# Patient Record
Sex: FEMALE | Race: WHITE | URBAN - METROPOLITAN AREA
[De-identification: names, ages, dates, MRNs, and addresses within clinical notes are randomized per-mention and may not be internally consistent; named-entity substitution may affect disease eponyms.]

---
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
## 2019-01-11 ENCOUNTER — OFFICE VISIT (OUTPATIENT)
Dept: FAMILY MEDICINE CLINIC | Age: 19
End: 2019-01-11

## 2019-01-11 VITALS
HEART RATE: 84 BPM | TEMPERATURE: 96.1 F | WEIGHT: 130.2 LBS | OXYGEN SATURATION: 100 % | DIASTOLIC BLOOD PRESSURE: 67 MMHG | HEIGHT: 63 IN | RESPIRATION RATE: 18 BRPM | BODY MASS INDEX: 23.07 KG/M2 | SYSTOLIC BLOOD PRESSURE: 112 MMHG

## 2019-01-11 DIAGNOSIS — Z23 ENCOUNTER FOR IMMUNIZATION: ICD-10-CM

## 2019-01-11 DIAGNOSIS — Z00.00 ROUTINE ADULT HEALTH MAINTENANCE: Primary | ICD-10-CM

## 2019-01-11 DIAGNOSIS — F90.2 ADHD (ATTENTION DEFICIT HYPERACTIVITY DISORDER), COMBINED TYPE: ICD-10-CM

## 2019-01-11 RX ORDER — METHYLPHENIDATE HYDROCHLORIDE 36 MG/1
72 TABLET ORAL
Qty: 180 TAB | Refills: 0 | Status: SHIPPED | OUTPATIENT
Start: 2019-01-11 | End: 2019-03-05

## 2019-01-11 NOTE — PROGRESS NOTES
Cyndee Kline is a 22 y.o. female (: 2000) presenting to address:    Chief Complaint   Patient presents with    Medication Evaluation       Vitals:    19 1323   BP: 112/67   Pulse: 84   Resp: 18   Temp: 96.1 °F (35.6 °C)   TempSrc: Oral   SpO2: 100%   Weight: 130 lb 3.2 oz (59.1 kg)   Height: 5' 3\" (1.6 m)   PainSc:   0 - No pain   LMP: 2018       Hearing/Vision:   No exam data present    Learning Assessment:     Learning Assessment 4/10/2015   PRIMARY LEARNER Patient   BARRIERS PRIMARY LEARNER NONE   CO-LEARNER CAREGIVER Yes   CO-LEARNER NAME Mother   CO-LEARNER HIGHEST LEVEL OF EDUCATION 4 YEARS OF COLLEGE   BARRIERS CO-LEARNER NONE   PRIMARY LANGUAGE ENGLISH   PRIMARY LANGUAGE CO-LEARNER ENGLISH    NEED No   LEARNER PREFERENCE PRIMARY LISTENING   LEARNER PREFERENCE CO-LEARNER LISTENING   LEARNING SPECIAL TOPICS no   ANSWERED BY self   RELATIONSHIP SELF     Depression Screening:     PHQ over the last two weeks 2018   Little interest or pleasure in doing things Not at all   Feeling down, depressed, irritable, or hopeless Not at all   Total Score PHQ 2 0   In the past year have you felt depressed or sad most days, even if you felt okay? -   Has there been a time in the past month when you have had serious thoughts about ending your life? -   Have you ever in your whole life, tried to kill yourself or made a suicide attempt? -     Fall Risk Assessment:     Fall Risk Assessment, last 12 mths 2017   Able to walk? Yes   Fall in past 12 months? No     Abuse Screening:     Abuse Screening Questionnaire 2017   Do you ever feel afraid of your partner? N   Are you in a relationship with someone who physically or mentally threatens you? N   Is it safe for you to go home? Y     Coordination of Care Questionaire:   1. Have you been to the ER, urgent care clinic since your last visit? Hospitalized since your last visit? NO    2.  Have you seen or consulted any other health care providers outside of the [address] [place] [address] since your last visit? Include any pap smears or colon screening. NO    Advanced Directive:   1. Do you have an Advanced Directive? NO    2. Would you like information on Advanced Directives? NO    Flu Immunization/s administered 1/11/2019 by Sherwin Dewey LPN with consent. Patient tolerated procedure well. No reactions noted.

## 2019-02-13 ENCOUNTER — TELEPHONE (OUTPATIENT)
Dept: FAMILY MEDICINE CLINIC | Age: 19
End: 2019-02-13

## 2019-02-13 NOTE — TELEPHONE ENCOUNTER
Received voicemail from patients father stating that patient is away in at [facility] and has a UTI and would like an antibiotic sent to [facility] in [facility]. Called patient and left voicemail for patient to return call to find out symptoms and how long have they been going on.

## 2019-03-05 ENCOUNTER — OFFICE VISIT (OUTPATIENT)
Dept: FAMILY MEDICINE CLINIC | Age: 19
End: 2019-03-05

## 2019-03-05 VITALS
TEMPERATURE: 96.3 F | BODY MASS INDEX: 22.79 KG/M2 | WEIGHT: 128.6 LBS | RESPIRATION RATE: 14 BRPM | OXYGEN SATURATION: 100 % | DIASTOLIC BLOOD PRESSURE: 63 MMHG | HEIGHT: 63 IN | SYSTOLIC BLOOD PRESSURE: 109 MMHG | HEART RATE: 83 BPM

## 2019-03-05 DIAGNOSIS — F90.2 ADHD (ATTENTION DEFICIT HYPERACTIVITY DISORDER), COMBINED TYPE: Chronic | ICD-10-CM

## 2019-03-05 DIAGNOSIS — R30.0 DYSURIA: Primary | ICD-10-CM

## 2019-03-05 LAB
BILIRUB UR QL STRIP: NEGATIVE
GLUCOSE UR-MCNC: NEGATIVE MG/DL
KETONES P FAST UR STRIP-MCNC: NEGATIVE MG/DL
PH UR STRIP: 8.5 [PH] (ref 4.6–8)
PROT UR QL STRIP: ABNORMAL
SP GR UR STRIP: 1.01 (ref 1–1.03)
UA UROBILINOGEN AMB POC: ABNORMAL (ref 0.2–1)
URINALYSIS CLARITY POC: CLEAR
URINALYSIS COLOR POC: YELLOW
URINE BLOOD POC: NEGATIVE
URINE LEUKOCYTES POC: NEGATIVE
URINE NITRITES POC: NEGATIVE

## 2019-03-05 RX ORDER — METHYLPHENIDATE HYDROCHLORIDE 36 MG/1
72 TABLET ORAL
Qty: 180 TAB | Refills: 0 | Status: SHIPPED | OUTPATIENT
Start: 2019-03-05 | End: 2019-08-22 | Stop reason: SDUPTHER

## 2019-03-05 NOTE — PATIENT INSTRUCTIONS
Painful Urination (Dysuria): Care Instructions  Your Care Instructions  Burning pain with urination (dysuria) is a common symptom of a urinary tract infection or other urinary problems. The bladder may become inflamed. This can cause pain when the bladder fills and empties. You may also feel pain if the tube that carries urine from the bladder to the outside of the body (urethra) gets irritated or infected. Sexually transmitted infections (STIs) also may cause pain when you urinate. Sometimes the pain can be caused by things other than an infection. The urethra can be irritated by soaps, perfumes, or foreign objects in the urethra. Kidney stones can cause pain when they pass through the urethra. The cause may be hard to find. You may need tests. Treatment for painful urination depends on the cause. Follow-up care is a key part of your treatment and safety. Be sure to make and go to all appointments, and call your doctor if you are having problems. It's also a good idea to know your test results and keep a list of the medicines you take. How can you care for yourself at home? · Drink extra water for the next day or two. This will help make the urine less concentrated. (If you have kidney, heart, or liver disease and have to limit fluids, talk with your doctor before you increase the amount of fluids you drink.)  · Avoid drinks that are carbonated or have caffeine. They can irritate the bladder. · Urinate often. Try to empty your bladder each time. For women:  · Urinate right after you have sex. · After going to the bathroom, wipe from front to back. · Avoid douches, bubble baths, and feminine hygiene sprays. And avoid other feminine hygiene products that have deodorants. When should you call for help? Call your doctor now or seek immediate medical care if:    · You have new symptoms, such as fever, nausea, or vomiting.     · You have new or worse symptoms of a urinary problem. For example:  ?  You have blood or pus in your urine. ? You have chills or body aches. ? It hurts worse to urinate. ? You have groin or belly pain. ? You have pain in your back just below your rib cage (the flank area).    Watch closely for changes in your health, and be sure to contact your doctor if you have any problems. Where can you learn more? Go to http://john-lexii.info/. Enter U500 in the search box to learn more about \"Painful Urination (Dysuria): Care Instructions. \"  Current as of: March 20, 2018  Content Version: 11.9  © 5903-2379 [employer]. Care instructions adapted under license by [employer] (which disclaims liability or warranty for this information). If you have questions about a medical condition or this instruction, always ask your healthcare professional. [address] any warranty or liability for your use of this information.

## 2019-03-05 NOTE — PROGRESS NOTES
Aman Santos is a 22 y.o. female (: 2000) presenting to address:    Chief Complaint   Patient presents with    Urinary Pain     x 1 week however no more symptoms     Urinary Frequency       Vitals:    19 1044   BP: 109/63   Pulse: 83   Resp: 14   Temp: 96.3 °F (35.7 °C)   TempSrc: Oral   SpO2: 100%   Weight: 128 lb 9.6 oz (58.3 kg)   Height: 5' 3\" (1.6 m)   PainSc:   0 - No pain   LMP: 2019       Hearing/Vision:   No exam data present    Learning Assessment:     Learning Assessment 4/10/2015   PRIMARY LEARNER Patient   BARRIERS PRIMARY LEARNER NONE   CO-LEARNER CAREGIVER Yes   CO-LEARNER NAME Mother   CO-LEARNER HIGHEST LEVEL OF EDUCATION 4 YEARS OF COLLEGE   BARRIERS CO-LEARNER NONE   PRIMARY LANGUAGE ENGLISH   PRIMARY LANGUAGE CO-LEARNER ENGLISH    NEED No   LEARNER PREFERENCE PRIMARY LISTENING   LEARNER PREFERENCE CO-LEARNER LISTENING   LEARNING SPECIAL TOPICS no   ANSWERED BY self   RELATIONSHIP SELF     Depression Screening:     3 most recent PHQ Screens 2018   Little interest or pleasure in doing things Not at all   Feeling down, depressed, irritable, or hopeless Not at all   Total Score PHQ 2 0   In the past year have you felt depressed or sad most days, even if you felt okay? -   Has there been a time in the past month when you have had serious thoughts about ending your life? -   Have you ever in your whole life, tried to kill yourself or made a suicide attempt? -     Fall Risk Assessment:     Fall Risk Assessment, last 12 mths 2017   Able to walk? Yes   Fall in past 12 months? No     Abuse Screening:     Abuse Screening Questionnaire 2017   Do you ever feel afraid of your partner? N   Are you in a relationship with someone who physically or mentally threatens you? N   Is it safe for you to go home? Y     Coordination of Care Questionaire:   1. Have you been to the ER, urgent care clinic since your last visit? Hospitalized since your last visit? NO    2. Have you seen or consulted any other health care providers outside of the [address] [place] [address] since your last visit? Include any pap smears or colon screening. NO    Advanced Directive:   1. Do you have an Advanced Directive? NO    2. Would you like information on Advanced Directives?  NO

## 2019-03-05 NOTE — PROGRESS NOTES
[address] [facility]  Primary Care Office Visit - Problem-Oriented    : 2000   Fallon Vasquez is a 22 y.o. female presenting for  Chief Complaint   Patient presents with    Urinary Pain     x 1 week however no more symptoms     Urinary Frequency       Assessment/Plan:     1. Dysuria  Initial encounter. Previously symptomatic, now resolved. Reassurance given. Also educated re: si/sx of UTI.  - AMB POC URINALYSIS DIP STICK AUTO W/O MICRO    2. ADHD (attention deficit hyperactivity disorder), combined type  Ongoing, stable. I have reviewed this patient's report generated by the [place] which does not demonstrate aberrancies and inconsistencies with regard to the historical prescribing of controlled medications to this patient by other providers. No change to current regimen. Has controlled sub agreement on file. - methylphenidate ER 36 mg 24 hr tab; Take 2 Tabs (72 mg total) by mouth every morning. Max Daily Amount: 72 mg  Dispense: 180 Tab; Refill: 0      Orders & Diagnoses associated with This Encounter:         ICD-10-CM ICD-9-CM   1. Dysuria R30.0 788.1   2. ADHD (attention deficit hyperactivity disorder), combined type F90.2 314.01       Orders Placed This Encounter    AMB POC URINALYSIS DIP STICK AUTO W/O MICRO    methylphenidate ER 36 mg 24 hr tab     Sig: Take 2 Tabs (72 mg total) by mouth every morning. Max Daily Amount: 72 mg     Dispense:  180 Tab     Refill:  0         This document may have been created with the aid of dictation software. Text may contain errors, particularly phonetic errors. Reviewed management plan & instructions with patient, who voiced understanding.     Mejia Gilmore MD  Internal Medicine, Family Medicine & Sports Medicine  3/5/2019    History:   Fallon Vasquez is a 22 y.o. female presenting to address:  Chief Complaint   Patient presents with    Urinary Pain     x 1 week however no more symptoms     Urinary Frequency     No further urinary symptoms. Never had back pain, fever. Did not ever end up going to [facility] for eval.  Self-resolved. Did use OTC Azo for a bit. No complaints of sleep disturbances, depression, headache, stomachache, appetite suppression, or elevated heart rate or elevated blood pressure. Past Medical History:   Diagnosis Date    ADD (attention deficit disorder)     Epilepsy ([address]      History reviewed. No pertinent surgical history. reports that  has never smoked. she has never used smokeless tobacco. She reports that she does not drink alcohol or use drugs. Social History     Social History Narrative    [employer] Class of 2022. Major = elementary education    Menarche @ age 7    Menses regular    (1/11/2019)     Social History     Tobacco Use   Smoking Status Never Smoker   Smokeless Tobacco Never Used     Family History   Problem Relation Age of Onset    Hypertension Father     High Cholesterol Father     Diabetes Maternal Grandmother     Stroke Maternal Grandmother     Heart Attack Maternal Grandmother     Diabetes Maternal Grandfather     Diabetes Paternal Grandmother     Diabetes Paternal Grandfather     Heart Attack Paternal Grandfather      No Known Allergies    Problem List:      Patient Active Problem List    Diagnosis    ADHD (attention deficit hyperactivity disorder), combined type    Vaccine refused by parent - HPV     Signed refusal form for HPV vaccination on 10/31/2016      Anxiety    Insomnia    Spells of decreased attentiveness     - 5/11/2015 [Dr. Horace Todd; peds neuro]: EEGs have been unhelpful, will hold off on any further EEGs at this time, okay for her to start driving with her permit      Vitamin D deficiency    Routine child health maintenance       Medications:     Current Outpatient Medications   Medication Sig    methylphenidate ER 36 mg 24 hr tab Take 2 Tabs (72 mg total) by mouth every morning.   Max Daily Amount: 72 mg    Cholecalciferol, Vitamin D3, (VITAMIN D3) 1,000 unit cap Take 1 Tab by mouth daily. No current facility-administered medications for this visit. Review of Systems:     Review of Systems   Constitutional: Negative for chills, fever and weight loss. HENT: Negative for ear pain and sore throat. Respiratory: Negative for cough and shortness of breath. Cardiovascular: Negative for chest pain and palpitations. Gastrointestinal: Negative for abdominal pain. Genitourinary: Negative for dysuria, flank pain, frequency, hematuria and urgency. Musculoskeletal: Negative for myalgias. Skin: Negative for rash. Neurological: Negative for dizziness, tingling, tremors, speech change, focal weakness, seizures and headaches. Endo/Heme/Allergies: Does not bruise/bleed easily. Psychiatric/Behavioral: Negative for depression. The patient is not nervous/anxious and does not have insomnia. Physical Assessment:   VS:    Vitals:    03/05/19 1044   BP: 109/63   Pulse: 83   Resp: 14   Temp: 96.3 °F (35.7 °C)   TempSrc: Oral   SpO2: 100%   Weight: 128 lb 9.6 oz (58.3 kg)   Height: 5' 3\" (1.6 m)   PainSc:   0 - No pain   LMP: 02/24/2019       Physical Exam   Constitutional: She is oriented to person, place, and time. She appears well-developed and well-nourished. HENT:   Head: Normocephalic and atraumatic. Eyes: EOM are normal.   Neck: Neck supple. No thyromegaly present. Cardiovascular: Normal rate, regular rhythm and intact distal pulses. No murmur heard. Pulmonary/Chest: Effort normal and breath sounds normal. She has no wheezes. She has no rales. Musculoskeletal: Normal range of motion. Neurological: She is alert and oriented to person, place, and time. No cranial nerve deficit. Coordination normal.   Skin: Skin is warm and dry. She is not diaphoretic. Psychiatric: She has a normal mood and affect.  Her behavior is normal. Judgment and thought content normal.   Nursing note reviewed.     Recent Labs & Imaging:     Recent Results (from the past 12 hour(s))   AMB POC URINALYSIS DIP STICK AUTO W/O MICRO    Collection Time: 03/05/19 10:58 AM   Result Value Ref Range    Color (UA POC) Yellow     Clarity (UA POC) Clear     Glucose (UA POC) Negative Negative    Bilirubin (UA POC) Negative Negative    Ketones (UA POC) Negative Negative    Specific gravity (UA POC) 1.015 1.001 - 1.035    Blood (UA POC) Negative Negative    pH (UA POC) 8.5 (A) 4.6 - 8.0    Protein (UA POC) Trace Negative    Urobilinogen (UA POC) 1 mg/dL 0.2 - 1    Nitrites (UA POC) Negative Negative    Leukocyte esterase (UA POC) Negative Negative

## 2019-08-22 ENCOUNTER — OFFICE VISIT (OUTPATIENT)
Dept: FAMILY MEDICINE CLINIC | Age: 19
End: 2019-08-22

## 2019-08-22 VITALS
TEMPERATURE: 96.8 F | HEIGHT: 63 IN | DIASTOLIC BLOOD PRESSURE: 69 MMHG | OXYGEN SATURATION: 100 % | HEART RATE: 75 BPM | WEIGHT: 125 LBS | BODY MASS INDEX: 22.15 KG/M2 | SYSTOLIC BLOOD PRESSURE: 109 MMHG | RESPIRATION RATE: 16 BRPM

## 2019-08-22 DIAGNOSIS — F90.2 ADHD (ATTENTION DEFICIT HYPERACTIVITY DISORDER), COMBINED TYPE: Primary | Chronic | ICD-10-CM

## 2019-08-22 DIAGNOSIS — Z23 ENCOUNTER FOR IMMUNIZATION: ICD-10-CM

## 2019-08-22 RX ORDER — METHYLPHENIDATE HYDROCHLORIDE 36 MG/1
72 TABLET ORAL
Qty: 180 TAB | Refills: 0 | Status: SHIPPED | OUTPATIENT
Start: 2019-08-22 | End: 2019-11-29 | Stop reason: SDUPTHER

## 2019-08-22 NOTE — PROGRESS NOTES
Fermin Shirley is a 22 y.o. female (: 2000) presenting to address:    Chief Complaint   Patient presents with    Medication Evaluation       Vitals:    19 1005   BP: 109/69   Pulse: 75   Resp: 16   Temp: 96.8 °F (36 °C)   TempSrc: Oral   SpO2: 100%   Weight: 125 lb (56.7 kg)   Height: 5' 3\" (1.6 m)   PainSc:   0 - No pain   LMP: 2019       Hearing/Vision:   No exam data present    Learning Assessment:     Learning Assessment 4/10/2015   PRIMARY LEARNER Patient   BARRIERS PRIMARY LEARNER NONE   CO-LEARNER CAREGIVER Yes   CO-LEARNER NAME Mother   CO-LEARNER HIGHEST LEVEL OF EDUCATION 4 YEARS OF COLLEGE   BARRIERS CO-LEARNER NONE   PRIMARY LANGUAGE ENGLISH   PRIMARY LANGUAGE CO-LEARNER ENGLISH    NEED No   LEARNER PREFERENCE PRIMARY LISTENING   LEARNER PREFERENCE CO-LEARNER LISTENING   LEARNING SPECIAL TOPICS no   ANSWERED BY self   RELATIONSHIP SELF     Depression Screening:     3 most recent PHQ Screens 2019   Little interest or pleasure in doing things Not at all   Feeling down, depressed, irritable, or hopeless Not at all   Total Score PHQ 2 0   In the past year have you felt depressed or sad most days, even if you felt okay? -   Has there been a time in the past month when you have had serious thoughts about ending your life? -   Have you ever in your whole life, tried to kill yourself or made a suicide attempt? -     Fall Risk Assessment:     Fall Risk Assessment, last 12 mths 2017   Able to walk? Yes   Fall in past 12 months? No     Abuse Screening:     Abuse Screening Questionnaire 2017   Do you ever feel afraid of your partner? N   Are you in a relationship with someone who physically or mentally threatens you? N   Is it safe for you to go home? Y     Coordination of Care Questionaire:   1. Have you been to the ER, urgent care clinic since your last visit? Hospitalized since your last visit? NO    2.  Have you seen or consulted any other health care providers outside of the [address] [place] [address] [facility] since your last visit? Include any pap smears or colon screening. NO    Advanced Directive:   1. Do you have an Advanced Directive? NO    2. Would you like information on Advanced Directives? NO    Flu Immunization/s administered 8/22/2019 by Marlene Looney LPN with patients consent. Patient tolerated procedure well. No reactions noted.

## 2019-08-22 NOTE — PROGRESS NOTES
[address] [facility]  Primary Care Office Visit - Problem-Oriented    : 2000   Stacie Toney is a 22 y.o. female presenting for  Chief Complaint   Patient presents with    Medication Evaluation       Assessment/Plan:     1. ADHD (attention deficit hyperactivity disorder), combined type  Well controlled, stable. No change to current medication regimen. Since she has been quite stable at this dose for a long time, and will be away at college, offered q6mo appts with q3mo refills as long as ADHD remains well controlled, and there are no side effects. She is aware that it is her responsibility to call for refills in advance of when she will run out of her current medication.  - methylphenidate ER 36 mg 24 hr tab; Take 2 Tabs by mouth every morning. Max Daily Amount: 72 mg. Dispense: 180 Tab; Refill: 0    2. Encounter for immunization  - INFLUENZA VIRUS VAC QUAD,SPLIT,PRESV FREE SYRINGE IM  - OH IMMUNIZ ADMIN,1 SINGLE/COMB VAC/TOXOID    I have reviewed this patient's report generated by the [facility] which does not demonstrate aberrancies and inconsistencies with regard to the historical prescribing of controlled medications to this patient by other providers. Orders & Diagnoses associated with This Encounter:         ICD-10-CM ICD-9-CM   1. ADHD (attention deficit hyperactivity disorder), combined type F90.2 314.01   2. Encounter for immunization Z23 V03.89       Orders Placed This Encounter    Influenza virus vaccine (QUADRIVALENT PRES FREE SYRINGE) IM ([id number])    [place] IMMUNIZ ADMIN,1 SINGLE/COMB VAC/TOXOID    methylphenidate ER 36 mg 24 hr tab     Sig: Take 2 Tabs by mouth every morning. Max Daily Amount: 72 mg. Dispense:  180 Tab     Refill:  0         This document may have been created with the aid of dictation software. Text may contain errors, particularly phonetic errors.       Reviewed management plan & instructions with patient, who voiced understanding. Radha Enriquez MD  Internal Medicine, Family Medicine & Sports Medicine  8/22/2019    Patient Instructions   Notes from your doctor:  - since your dose for your ADHD medication has been really stable, if you don't have any issues in 2mo, you can simply call and request for 3mo more of refills. However, please note that if there is any change in your ADHD control, or you start having side effects, you will need to come in to the office for an evaluation          History:   Tiffany Cortes is a 22 y.o. female presenting to address:  Chief Complaint   Patient presents with    Medication Evaluation       No complaints of sleep disturbances, depression, headache, stomachache, appetite suppression, or elevated heart rate or elevated blood pressure. Notes ADHD is well controlled. Is going back to [employer] for college this coming weekend. Past Medical History:   Diagnosis Date    ADD (attention deficit disorder)     Ep[address] [address]      History reviewed. No pertinent surgical history. reports that she has never smoked. She has never used smokeless tobacco. She reports that she does not drink alcohol or use drugs. Social History     Social History Narrative    [employer] Class of 2022.     Major = elementary education    Menarche @ age 7    Menses regular    (1/11/2019)     Social History     Tobacco Use   Smoking Status Never Smoker   Smokeless Tobacco Never Used     Family History   Problem Relation Age of Onset    Hypertension Father     High Cholesterol Father     Diabetes Maternal Grandmother     Stroke Maternal Grandmother     Heart Attack Maternal Grandmother     Diabetes Maternal Grandfather     Diabetes Paternal Grandmother     Diabetes Paternal Grandfather     Heart Attack Paternal Grandfather      No Known Allergies    Problem List:      Patient Active Problem List    Diagnosis    ADHD (attention deficit hyperactivity disorder), combined type    Vaccine refused by parent - HPV     Signed refusal form for HPV vaccination on 10/31/2016      Anxiety    Insomnia    Spells of decreased attentiveness     - 5/11/2015 [Dr. Gilles Prieto; peds neuro]: EEGs have been unhelpful, will hold off on any further EEGs at this time, okay for her to start driving with her permit      Vitamin D deficiency    Routine child health maintenance       Medications:     Current Outpatient Medications   Medication Sig    methylphenidate ER 36 mg 24 hr tab Take 2 Tabs (72 mg total) by mouth every morning. Max Daily Amount: 72 mg    Cholecalciferol, Vitamin D3, (VITAMIN D3) 1,000 unit cap Take 1 Tab by mouth daily. No current facility-administered medications for this visit. Review of Systems:     Review of Systems   Constitutional: Negative for weight loss. Respiratory: Negative for shortness of breath. Cardiovascular: Negative for chest pain and palpitations. Gastrointestinal: Negative for abdominal pain. Neurological: Negative for dizziness, tingling, tremors, seizures and headaches. Psychiatric/Behavioral: Negative for depression. The patient is not nervous/anxious and does not have insomnia. Physical Assessment:   VS:    Vitals:    08/22/19 1005   BP: 109/69   Pulse: 75   Resp: 16   Temp: 96.8 °F (36 °C)   TempSrc: Oral   SpO2: 100%   Weight: 125 lb (56.7 kg)   Height: 5' 3\" (1.6 m)   PainSc:   0 - No pain   LMP: 08/01/2019       Physical Exam   Constitutional: She is oriented to person, place, and time. She appears well-developed and well-nourished. HENT:   Head: Normocephalic and atraumatic. Eyes: EOM are normal.   Neck: Neck supple. No thyromegaly present. Cardiovascular: Normal rate, regular rhythm and intact distal pulses. No murmur heard. Pulmonary/Chest: Effort normal and breath sounds normal. She has no wheezes. She has no rales. Musculoskeletal: Normal range of motion. Neurological: She is alert and oriented to person, place, and time.  No cranial nerve deficit. Coordination normal.   Skin: Skin is warm and dry. She is not diaphoretic. Psychiatric: She has a normal mood and affect. Her behavior is normal. Judgment and thought content normal.   Nursing note reviewed.

## 2019-08-22 NOTE — PATIENT INSTRUCTIONS
Notes from your doctor:  - since your dose for your ADHD medication has been really stable, if you don't have any issues in 2mo, you can simply call and request for 3mo more of refills.   However, please note that if there is any change in your ADHD control, or you start having side effects, you will need to come in to the office for an evaluation

## 2019-11-29 ENCOUNTER — OFFICE VISIT (OUTPATIENT)
Dept: FAMILY MEDICINE CLINIC | Age: 19
End: 2019-11-29

## 2019-11-29 DIAGNOSIS — F90.2 ADHD (ATTENTION DEFICIT HYPERACTIVITY DISORDER), COMBINED TYPE: Chronic | ICD-10-CM

## 2019-11-29 RX ORDER — METHYLPHENIDATE HYDROCHLORIDE 36 MG/1
72 TABLET ORAL
Qty: 180 TAB | Refills: 0 | Status: SHIPPED | OUTPATIENT
Start: 2019-11-29 | End: 2020-03-06 | Stop reason: SDUPTHER

## 2020-03-05 NOTE — PROGRESS NOTES
[address] [facility]  Primary Care Office Visit - Problem-Oriented    : 2000   Leni Espino is a 20 y.o. female presenting for  Chief Complaint   Patient presents with    Medication Evaluation     print rx per mother            Assessment/Plan:     1. ADHD (attention deficit hyperactivity disorder), combined type  Ongoing, well controlled. I have reviewed this patient's report generated by the [place] which does not demonstrate aberrancies and inconsistencies with regard to the historical prescribing of controlled medications to this patient by other providers. No change to current regimen. Advised patient that if symptoms well controlled, no adverse effects, may contact our office in approximately 2-1/2 months an additional 3 months of prescriptions will be authorized. Follow-up if all goes well in 6 months. - methylphenidate ER 36 mg 24 hr tab; Take 2 Tabs by mouth every morning. Max Daily Amount: 72 mg. Dispense: 180 Tab; Refill: 0    2. Controlled substance agreement signed  Leni Espino signed an updated controlled substance agreement today, and a copy of that signed document was provided to her for her personal records. Follow-up and Dispositions    · Return in about 6 months (around 2020) for 30min complete physical.         Orders & Diagnoses associated with This Encounter:         ICD-10-CM ICD-9-CM   1. Controlled substance agreement signed Z79.899 V58.69   2. ADHD (attention deficit hyperactivity disorder), combined type F90.2 314.01       Orders Placed This Encounter    methylphenidate ER 36 mg 24 hr tab     Sig: Take 2 Tabs by mouth every morning. Max Daily Amount: 72 mg. Dispense:  180 Tab     Refill:  0         This document may have been created with the aid of dictation software. Text may contain errors, particularly phonetic errors. Reviewed management plan & instructions with patient, who voiced understanding. Rama Hurtado MD  Internal Medicine, Family Medicine & Sports Medicine  3/6/2020    Subjective   History:   Joey Nolasco is a 20 y.o. 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 Grades are wonderful. Doing well on current medication. No complaints of sleep disturbances, depression, headache, stomachache, appetite suppression, or elevated heart rate or elevated blood pressure. Past Medical History:   Diagnosis Date    ADD (attention deficit disorder)     Ep[address] [address]      History reviewed. No pertinent surgical history. reports that she has never smoked. She has never used smokeless tobacco. She reports that she does not drink alcohol or use drugs. Social History     Social History Narrative    [employer] Class of 2022.     Major = elementary education    Menarche @ age 7    Menses regular    (1/11/2019)     Social History     Tobacco Use   Smoking Status Never Smoker   Smokeless Tobacco Never Used     Family History   Problem Relation Age of Onset    Hypertension Father     High Cholesterol Father     Diabetes Maternal Grandmother     Stroke Maternal Grandmother     Heart Attack Maternal Grandmother     Diabetes Maternal Grandfather     Diabetes Paternal Grandmother     Diabetes Paternal Grandfather     Heart Attack Paternal Grandfather      No Known Allergies    Problem List:      Patient Active Problem List    Diagnosis    ADHD (attention deficit hyperactivity disorder), combined type    Controlled substance agreement signed     3/6/2020      Vaccine refused by parent - HPV     Signed refusal form for HPV vaccination on 10/31/2016      Anxiety    Insomnia    Spells of decreased attentiveness     - 5/11/2015 [Dr. Vinh Mckinnon; peds neuro]: EEGs have been unhelpful, will hold off on any further EEGs at this time, okay for her to start driving with her permit      Vitamin D deficiency       Medications:     Current Outpatient Medications   Medication Sig    methylphenidate ER 36 mg 24 hr tab Take 2 Tabs by mouth every morning. Max Daily Amount: 72 mg.  Cholecalciferol, Vitamin D3, (VITAMIN D3) 1,000 unit cap Take 1 Tab by mouth daily. No current facility-administered medications for this visit. Review of Systems:     Review of Systems   Constitutional: Negative for weight loss. Respiratory: Negative for shortness of breath. Cardiovascular: Negative for chest pain and palpitations. Gastrointestinal: Negative for abdominal pain. Neurological: Negative for dizziness, tingling, tremors, seizures and headaches. Psychiatric/Behavioral: Negative for depression. The patient is not nervous/anxious and does not have insomnia. Objective   Physical Assessment:   VS:    Vitals:    03/06/20 1328   BP: 112/71   Pulse: 69   Resp: 16   Temp: 98 °F (36.7 °C)   TempSrc: Oral   SpO2: 100%   Weight: 133 lb 6.4 oz (60.5 kg)   Height: 5' 3\" (1.6 m)   PainSc:   0 - No pain   LMP: 02/29/2020       Physical Exam  Nursing note reviewed. Constitutional:       Appearance: She is well-developed. She is not diaphoretic. HENT:      Head: Normocephalic and atraumatic. Neck:      Musculoskeletal: Neck supple. Thyroid: No thyromegaly. Cardiovascular:      Rate and Rhythm: Normal rate and regular rhythm. Heart sounds: No murmur. Pulmonary:      Effort: Pulmonary effort is normal.      Breath sounds: Normal breath sounds. No wheezing or rales. Musculoskeletal: Normal range of motion. Skin:     General: Skin is warm and dry. Neurological:      Mental Status: She is alert and oriented to person, place, and time. Cranial Nerves: No cranial nerve deficit. Coordination: Coordination normal.   Psychiatric:         Behavior: Behavior normal.         Thought Content:  Thought content normal.         Judgment: Judgment normal.

## 2020-03-05 NOTE — PATIENT INSTRUCTIONS
Notes from your doctor:  - next visit is a \"Complete Physical\"  - since your dose for your ADHD medication has been really stable, if you don't have any issues in 2mo, you can simply call and request for 3mo more of refills. Volney Awkward, please note that if there is any change in your ADHD control, or you start having side effects, you will need to come in to the office for an evaluation    You signed an updated controlled substances agreement during your visit, and a copy of the signed document was given to you for your personal records. TESTING RESULTS   Results will be released to [employer]. Normal results will be sent via mail. If you have questions about your results, please schedule a follow up appointment to discuss with your PCP. MEDICATION REFILLS    Please allow at least 2 business days for refill requests to be addressed. Medication refills may be requested through your pharmacy. Refills will not be provided by the after hours/on call provider.

## 2020-03-06 ENCOUNTER — OFFICE VISIT (OUTPATIENT)
Dept: FAMILY MEDICINE CLINIC | Age: 20
End: 2020-03-06

## 2020-03-06 VITALS
WEIGHT: 133.4 LBS | TEMPERATURE: 98 F | BODY MASS INDEX: 23.64 KG/M2 | RESPIRATION RATE: 16 BRPM | HEART RATE: 69 BPM | SYSTOLIC BLOOD PRESSURE: 112 MMHG | HEIGHT: 63 IN | OXYGEN SATURATION: 100 % | DIASTOLIC BLOOD PRESSURE: 71 MMHG

## 2020-03-06 DIAGNOSIS — Z79.899 CONTROLLED SUBSTANCE AGREEMENT SIGNED: ICD-10-CM

## 2020-03-06 DIAGNOSIS — F90.2 ADHD (ATTENTION DEFICIT HYPERACTIVITY DISORDER), COMBINED TYPE: Primary | Chronic | ICD-10-CM

## 2020-03-06 RX ORDER — METHYLPHENIDATE HYDROCHLORIDE 36 MG/1
72 TABLET ORAL
Qty: 180 TAB | Refills: 0 | Status: SHIPPED | OUTPATIENT
Start: 2020-03-06 | End: 2020-05-29 | Stop reason: SDUPTHER

## 2020-03-06 NOTE — PROGRESS NOTES
Mary Metzger is a 20 y.o. female (: 2000) presenting to address:    Chief Complaint   Patient presents with    Medication Evaluation     print rx per mother       Vitals:    20 1328   BP: 112/71   Pulse: 69   Resp: 16   Temp: 98 °F (36.7 °C)   TempSrc: Oral   SpO2: 100%   Weight: 133 lb 6.4 oz (60.5 kg)   Height: 5' 3\" (1.6 m)   PainSc:   0 - No pain   LMP: 2020       Hearing/Vision:   No exam data present    Learning Assessment:     Learning Assessment 4/10/2015   PRIMARY LEARNER Patient   BARRIERS PRIMARY LEARNER NONE   CO-LEARNER CAREGIVER Yes   CO-LEARNER NAME Mother   CO-LEARNER HIGHEST LEVEL OF EDUCATION 4 YEARS OF COLLEGE   BARRIERS CO-LEARNER NONE   PRIMARY LANGUAGE ENGLISH   PRIMARY LANGUAGE CO-LEARNER ENGLISH    NEED No   LEARNER PREFERENCE PRIMARY LISTENING   LEARNER PREFERENCE CO-LEARNER LISTENING   LEARNING SPECIAL TOPICS no   ANSWERED BY self   RELATIONSHIP SELF     Depression Screening:     3 most recent PHQ Screens 3/6/2020   Little interest or pleasure in doing things Not at all   Feeling down, depressed, irritable, or hopeless Not at all   Total Score PHQ 2 0   In the past year have you felt depressed or sad most days, even if you felt okay? -   Has there been a time in the past month when you have had serious thoughts about ending your life? -   Have you ever in your whole life, tried to kill yourself or made a suicide attempt? -     Fall Risk Assessment:     Fall Risk Assessment, last 12 mths 2017   Able to walk? Yes   Fall in past 12 months? No     Abuse Screening:     Abuse Screening Questionnaire 2017   Do you ever feel afraid of your partner? N   Are you in a relationship with someone who physically or mentally threatens you? N   Is it safe for you to go home? Y     Coordination of Care Questionaire:   1. Have you been to the ER, urgent care clinic since your last visit? Hospitalized since your last visit? NO    2.  Have you seen or consulted any other health care providers outside of the [facility] since your last visit? Include any pap smears or colon screening. NO    Advanced Directive:   1. Do you have an Advanced Directive? NO    2. Would you like information on Advanced Directives?  NO

## 2020-03-14 PROBLEM — Z79.899 CONTROLLED SUBSTANCE AGREEMENT SIGNED: Status: ACTIVE | Noted: 2020-03-14

## 2020-09-15 ENCOUNTER — PATIENT MESSAGE (OUTPATIENT)
Dept: FAMILY MEDICINE CLINIC | Age: 20
End: 2020-09-15

## 2020-09-15 DIAGNOSIS — F90.2 ADHD (ATTENTION DEFICIT HYPERACTIVITY DISORDER), COMBINED TYPE: Chronic | ICD-10-CM

## 2020-09-15 RX ORDER — METHYLPHENIDATE HYDROCHLORIDE 36 MG/1
72 TABLET ORAL
Qty: 180 TAB | Refills: 0 | Status: SHIPPED | OUTPATIENT
Start: 2020-09-15 | End: 2020-09-16 | Stop reason: SDUPTHER

## 2020-09-15 NOTE — TELEPHONE ENCOUNTER
Orders Placed This Encounter  methylphenidate ER 36 mg 24 hr tab Sig: Take 2 Tabs by mouth every morning. Max Daily Amount: 72 mg. Dispense:  180 Tab Refill:  0 Last PDMP Inga Iverson as Reviewed: 
Review User Review Instant Review Result Nii Rhodes 9/15/2020 10:45 AM Reviewed PDMP [1]

## 2020-09-15 NOTE — TELEPHONE ENCOUNTER
[place] [facility] [place] Aurelia Narvaez MD 
Sent: 9/15/2020 7:22 AM EDT Subject: Prescription Question Dr. Mery Ortiz, Jer Bravo! I'm getting low on Concerta and was wondering how to go about getting another prescription? Thank you!! [facility] [place]

## 2020-09-16 RX ORDER — METHYLPHENIDATE HYDROCHLORIDE 36 MG/1
72 TABLET ORAL
Qty: 180 TAB | Refills: 0 | Status: SHIPPED | OUTPATIENT
Start: 2020-09-16 | End: 2020-12-16 | Stop reason: SDUPTHER

## 2020-10-05 ENCOUNTER — VIRTUAL VISIT (OUTPATIENT)
Dept: FAMILY MEDICINE CLINIC | Age: 20
End: 2020-10-05
Payer: OTHER GOVERNMENT

## 2020-10-05 DIAGNOSIS — F90.2 ADHD (ATTENTION DEFICIT HYPERACTIVITY DISORDER), COMBINED TYPE: Primary | ICD-10-CM

## 2020-10-05 PROCEDURE — 99441 PR PHYS/QHP TELEPHONE EVALUATION 5-10 MIN: CPT | Performed by: FAMILY MEDICINE

## 2020-10-05 NOTE — PROGRESS NOTES
[address] [place] [address]  Primary Care Office Visit - Telemedicine Problem-Oriented Note    Consent: Tani Boyer, who was seen by synchronous (real-time) audio only technology, and/or her healthcare decision maker, is aware that this patient-initiated, Telehealth encounter on 10/5/2020 is a billable service, with coverage as determined by her insurance carrier. She is aware that she may receive a bill and has provided verbal consent to proceed: Yes. This service was provided through telehealth via [employer]. me, both the Patient Home and [address] [place] [address]. Assessment & Plan:       ICD-10-CM ICD-9-CM   1. ADHD (attention deficit hyperactivity disorder), combined type  F90.2 314.01       Well controlled. No change to current regimen (has refills already). Key Psychotherapeutic Meds             methylphenidate ER 36 mg 24 hr tab (Taking) Take 2 Tabs by mouth every morning. Max Daily Amount: 72 mg. Advised to schedule WWE (needs first pap) at her convenience within the next 6mo or so. Educated re: what is involved with a pelvic exam.    Total tel time = 10min    No orders of the defined types were placed in this encounter. [id number]      Subjective:   Tani Boyer is a 23 y.o. female who was seen for Medication Evaluation (ADHD)    [facility]). 50% of classes are in-person. No complaints of sleep disturbances, depression, headache, stomachache, appetite suppression, or elevated heart rate or elevated blood pressure. Notes that she is sexually active. Has never had a pap smear / pelvic exam.      Prior to Admission medications    Medication Sig Start Date End Date Taking? Authorizing Provider   methylphenidate ER 36 mg 24 hr tab Take 2 Tabs by mouth every morning. Max Daily Amount: 72 mg. 9/16/20   Connor Lopez MD   Cholecalciferol, Vitamin D3, (VITAMIN D3) 1,000 unit cap Take 1 Tab by mouth daily.     Provider, Historical     No Known Allergies    Patient Active Problem List    Diagnosis Date Noted    ADHD (attention deficit hyperactivity disorder), combined type 12/11/2013     Priority: 1 - One    Controlled substance agreement signed 03/14/2020    Vaccine refused by parent - HPV 10/31/2016    Anxiety 12/26/2015    Insomnia 12/26/2015    Spells of decreased attentiveness 04/16/2015    Vitamin D deficiency 04/16/2015     Past Medical History:   Diagnosis Date    ADD (attention deficit disorder)     Epilepsy ([facility] [address]      No past surgical history on file. Family History   Problem Relation Age of Onset    Hypertension Father     High Cholesterol Father     Diabetes Maternal Grandmother     Stroke Maternal Grandmother     Heart Attack Maternal Grandmother     Diabetes Maternal Grandfather     Diabetes Paternal Grandmother     Diabetes Paternal Grandfather     Heart Attack Paternal Grandfather      Social History     Tobacco Use    Smoking status: Never Smoker    Smokeless tobacco: Never Used   Substance Use Topics    Alcohol use: No       Review of Systems   Constitutional: Negative for weight loss. Respiratory: Negative for shortness of breath. Cardiovascular: Negative for chest pain and palpitations. Gastrointestinal: Negative for abdominal pain. Neurological: Negative for dizziness, tingling, tremors, seizures and headaches. Psychiatric/Behavioral: Negative for depression. The patient is not nervous/anxious and does not have insomnia. Objective:   Vital Signs: (As obtained by patient/caregiver at home)  There were no vitals taken for this visit. Physical Exam  Constitutional:       Comments: ( audio only - physical exam not performed )              We discussed the expected course, resolution and complications of the diagnosis(es) in detail. Medication risks, benefits, costs, interactions, and alternatives were discussed as indicated.   I advised her to contact the office if her condition worsens, changes or fails to improve as anticipated. She expressed understanding with the diagnosis(es) and plan. Gemini Black is a 23 y.o. female who was evaluated by an audio only encounter for concerns as above. Patient identification was verified prior to start of the visit. A caregiver was present when appropriate. Due to this being a TeleHealth encounter (During YAGJO-29 public health emergency), evaluation of the following organ systems was limited: Vitals/Constitutional/EENT/Resp/CV/GI//MS/Neuro/Skin/Heme-Lymph-Imm. Pursuant to the emergency declaration under the [facility], [facility] waiver authority and the [employer] Act, this Virtual Visit was conducted, with patient's (and/or legal guardian's) consent, to reduce the patient's risk of exposure to COVID-19 and provide necessary medical care. Services were provided through a synchronous discussion virtually to substitute for in-person clinic visit. I was in the office. The patient was at home.       Amina Russell MD  Internal Medicine, Family Medicine & Sports Medicine

## 2020-12-16 DIAGNOSIS — F90.2 ADHD (ATTENTION DEFICIT HYPERACTIVITY DISORDER), COMBINED TYPE: Chronic | ICD-10-CM

## 2020-12-16 RX ORDER — METHYLPHENIDATE HYDROCHLORIDE 36 MG/1
72 TABLET ORAL
Qty: 180 TAB | Refills: 0 | Status: SHIPPED | OUTPATIENT
Start: 2020-12-16 | End: 2021-04-29 | Stop reason: SDUPTHER

## 2020-12-16 NOTE — TELEPHONE ENCOUNTER
Last visit: 10/5/2020 Next visit: not scheduled Last filled: 9/16/2020; methylpenidate ER 36 mg 24 hr tab; qty 180 w/no refills

## 2020-12-16 NOTE — TELEPHONE ENCOUNTER
Orders Placed This Encounter  methylphenidate ER 36 mg 24 hr tab Sig: Take 2 Tabs by mouth every morning. Max Daily Amount: 72 mg. Dispense:  180 Tab Refill:  0 Last PDMP Elif Standard as Reviewed: 
Review User Review Instant Review Result Woodrow Lockhart 12/16/2020  1:30 PM Reviewed PDMP [1]

## 2021-05-25 ENCOUNTER — CLINICAL SUPPORT (OUTPATIENT)
Dept: FAMILY MEDICINE CLINIC | Age: 21
End: 2021-05-25
Payer: OTHER GOVERNMENT

## 2021-05-25 DIAGNOSIS — Z11.1 SCREENING FOR TUBERCULOSIS: Primary | ICD-10-CM

## 2021-05-25 PROCEDURE — 86580 TB INTRADERMAL TEST: CPT | Performed by: FAMILY MEDICINE

## 2021-05-25 NOTE — PROGRESS NOTES
PPD Placement note  Gulshan Haynes, 23 y.o. female is here today for placement of PPD test  Reason for PPD test: Work  Pt taken PPD test before: yes  Verified in allergy area and with patient that they are not allergic to the products PPD is made of (Phenol or Tween). Yes  Is patient taking any oral or IV steroid medication now or have they taken it in the last month? no  Has the patient ever received the BCG vaccine?: no  Has the patient been in recent contact with anyone known or suspected of having active TB disease?: no       Date of exposure (if applicable): NA       Name of person they were exposed to (if applicable): NA  Patient's Country of origin?: [place]  O: Alert and oriented in NAD. P:  PPD placed on 5/25/2021. Patient advised to return for reading within 48-72 hours. Patient also did update controlled substance agreement.

## 2021-05-27 ENCOUNTER — OFFICE VISIT (OUTPATIENT)
Dept: FAMILY MEDICINE CLINIC | Age: 21
End: 2021-05-27
Payer: OTHER GOVERNMENT

## 2021-05-27 VITALS
BODY MASS INDEX: 23.57 KG/M2 | TEMPERATURE: 98.8 F | HEIGHT: 63 IN | DIASTOLIC BLOOD PRESSURE: 76 MMHG | WEIGHT: 133 LBS | OXYGEN SATURATION: 100 % | RESPIRATION RATE: 16 BRPM | HEART RATE: 92 BPM | SYSTOLIC BLOOD PRESSURE: 119 MMHG

## 2021-05-27 DIAGNOSIS — F90.2 ADHD (ATTENTION DEFICIT HYPERACTIVITY DISORDER), COMBINED TYPE: ICD-10-CM

## 2021-05-27 DIAGNOSIS — R55 EPISODE OF LOSS OF CONSCIOUSNESS: ICD-10-CM

## 2021-05-27 DIAGNOSIS — R68.89 SPELLS OF DECREASED ATTENTIVENESS: ICD-10-CM

## 2021-05-27 DIAGNOSIS — Z00.00 ROUTINE ADULT HEALTH MAINTENANCE: Primary | ICD-10-CM

## 2021-05-27 LAB
MM INDURATION POC: 0 MM (ref 0–5)
PPD POC: NEGATIVE NEGATIVE

## 2021-05-27 PROCEDURE — 99395 PREV VISIT EST AGE 18-39: CPT | Performed by: FAMILY MEDICINE

## 2021-05-27 RX ORDER — METHYLPHENIDATE HYDROCHLORIDE 36 MG/1
36 TABLET ORAL
Qty: 90 TABLET | Refills: 0 | Status: SHIPPED | OUTPATIENT
Start: 2021-05-27 | End: 2021-05-27

## 2021-05-27 RX ORDER — METHYLPHENIDATE HYDROCHLORIDE 36 MG/1
36 TABLET ORAL
Qty: 90 TABLET | Refills: 0
Start: 2021-05-27 | End: 2021-10-26 | Stop reason: SDUPTHER

## 2021-05-27 NOTE — PATIENT INSTRUCTIONS
My office will send you a [employer] message about your neurology referral. 
In the meantime. .. still no driving (thank you for being so responsible!) Dr. Boogie Hackett no longer practicing Primary Care effective July 1st, 2021 Due to increasing family obligations, I will no longer be practicing as a Primary Care physician effective July 1, 2021. Leading up to July 1st, I will work collaboratively with my current primary care colleagues at [facility]) to facilitate this transition for my primary care patients. If you have not been contacted about any appointments scheduled for after July 1, 2021, please assist [facility] in optimizing the continuity of your care by contacting the practice for appropriate and timely rescheduling. Dr. Boogie Hackett will continue with [facility] as a Sports Medicine Specialist   
 
After July 1st, I will continue to serve the [facility] as a Sports Medicine Physician. My sports medicine practice will offer expertise in the non-operative treatment of acute and chronic musculoskeletal conditions, as well as non-musculoskeletal aspects of sports medicine for patients 12 years and older. Common examples of non-musculoskeletal sports medicine include: concussion (mild traumatic brain injury), exercise prescription, injury prevention, and return to [facility] decisions for the , the [employer] and the [employer]. I hope that you and your loved ones will keep me in mind for your sports medicine needs. Before July 1st:  After July 1st: [phone] TESTING RESULTS If you have MyChart access:  Per federal regulations as of October 20th, 2020, all of your results will be released to you and to your physician / provider simultaneously on [address]   
o This means that it is likely that you will have the opportunity to review your results before your physician / provider!  
 Since all \"critical\" abnormal results are immediately called to the office / on-call providers on nights, weekends and holidays - please refrain from calling after hours when you receive abnormal results through [address] Instead, allow time for your physician / provider to review your results and then provide interpretation and/or guidance.  If the results are significantly abnormal and require time-sensitive action - guidance will be provided both via Shared [employer]form[employer] and via telephone.  For all other results (interpreted as \"normal\", \"abnormal but expected\", \"reassuring / stable\", \"slightly abnormal\") - non-urgent guidance will be provided via Mob[employer] communication only. [place] [address] #[phone] If you do NOT have Shared Performancet access:  If the results are significantly abnormal and require time-sensitive action - guidance will be relayed to you via telephone.  For all other results (interpreted as \"normal\", \"abnormal but expected\", \"reassuring / stable\", \"slightly abnormal\") - non-urgent guidance will be mailed to you via [employer] Results from Outside Facilities / Laboratories: 
Results of tests performed at outside facilities / laboratories likely will not appear in the [facility]. 
o They may appear in the patient portals of those outside facilities / laboratories. o Please keep in mind that with your access to your patient portal directly to an outside facility / laboratory, you are likely viewing results before your physician / provider! Please allow time for your physician / provider to review your results and then provide interpretation and/or guidance. If you have questions about your results beyond the guidance provided in Shared [employer]form[employer]et or in your results letter, please schedule a follow up appointment to discuss with your physician / provider. MEDICATION REFILLS  Please request medication refills through your pharmacy, to ensure the correct pharmacy is used.  
 Please allow at least 2 business days for refill requests to be addressed.  Refills will not be provided by the after hours/on call provider. Pelvic Exam for Teens: Care Instructions Overview A pelvic exam is a physical exam that lets your doctor check to see if your pelvic organs are healthy. You may have a pelvic exam if you have abnormal vaginal bleeding or an infection in your vagina. Or you could have one if you have pain in your pelvis. You might also have this kind of exam to check for sexually transmitted infections (STIs). Before the exam, it's best to talk honestly with your doctor. Tell your doctor if you might be pregnant. And tell your doctor if you have a pelvic problem or any other health problem. You can also use this time to ask any questions about your body, birth control, or STIs. If you are not having sex, but you're thinking about it, you can also discuss that. If you are sexually active, it's important for your doctor to know that. This is so your doctor can check for signs of pregnancy. You can also be checked for STIs, such as herpes and gonorrhea. Follow-up care is a key part of your treatment and safety. Be sure to make and go to all appointments, and call your doctor if you are having problems. It's also a good idea to know your test results and keep a list of the medicines you take. How is a pelvic exam done? · During a pelvic exam, you will: ? Take off your clothes below the waist. You will get a paper or cloth cover to put over the lower half of your body. ? Lie on your back on an exam table with your feet and legs supported by footrests. · The doctor may: ? Ask you to relax your knees. Your knees need to lean out, toward the walls. ? Put on gloves and check the opening of your vagina for sores or swelling. ? Gently put a tool called a speculum into your vagina. It opens the vagina a little bit. You may feel some pressure.  The speculum lets your doctor see inside the vagina. ? Use a small brush, spatula, or swab to get a sample for testing. The doctor then removes the speculum. ? Put one or two fingers of one hand into your vagina. The other hand goes on your lower belly. This lets your doctor feel your pelvic organs. You will probably feel some pressure. This exam takes about 10 minutes. You may have a small amount of vaginal discharge or bleeding after the exam. 
Why is a pelvic exam for teens done? · You think you have a vaginal infection. Signs include itching, burning, or unusual discharge. · You have vaginal bleeding that is not part of your normal menstrual period. · You have pain in your belly or pelvis. · You are pregnant. · You have been sexually assaulted. A pelvic exam lets your doctor collect evidence and check for STIs. What are the risks of a pelvic exam? 
There is a small chance that the doctor will find something on a pelvic exam that would not have caused a problem. This is called overdiagnosis. It could lead to tests or treatment you don't need. When should you call for help? Watch closely for changes in your health, and be sure to contact your doctor if you have any problems. Where can you learn more? Go to http://www.gray.com/ Enter F896 in the search box to learn more about \"Pelvic Exam for Teens: Care Instructions. \" Current as of: July 17, 2020               Content Version: 12.8 © 1646-6129 [employer]. Care instructions adapted under license by [employer] (which disclaims liability or warranty for this information). If you have questions about a medical condition or this instruction, always ask your healthcare professional. [address] any warranty or liability for your use of this information.

## 2021-05-27 NOTE — PROGRESS NOTES
PPD Reading Note  [facility] read and results [facility] Result: 0 mm induration.   Interpretation: neg  Allergic reaction: no

## 2021-05-27 NOTE — PROGRESS NOTES
Note to patient:  The purpose of this note is to communicate optimally with the other physicians / APCs involved in your care. It is written using standard medical terminology. If you have questions regarding the details of the note, please contact my office to schedule an appointment to address your questions. [employer]  Primary Care Office Visit - Complete Physical    Maximo Dumont is a 23 y.o. female presenting for annual physical.  : 2000        Assessment/Plan:       ICD-10-CM ICD-9-CM   1. Routine adult health maintenance  Z00.00 V70.0   2. Spells of decreased attentiveness  R68.89 780.99   3. Episode of loss of consciousness  R55 780.09   4. ADHD (attention deficit hyperactivity disorder), combined type  F90.2 314.01       # HM  Will need pap at some point in the future (has been sexually active with a male partner in the past, current partner is female). > labs    # recent witness LOC  With hx of   # spells of decreased attentiveness  > positive reinforcement given for refraining from driving herself.   Should continue to do so until evaluated by neurology  > referral to neurology    # ADHD - controlled on lower dose of methylphenidate  > no change to current regimen   Has signed CSA on file  Last PDMP Matthew as Reviewed:  Review User Review Instant Review Result   YUDI SHAW 2021 11:39 AM Reviewed PDMP [1]       Orders Placed This Encounter    CBC WITH AUTOMATED DIFF     Standing Status:   Future     Standing Expiration Date:   7196    METABOLIC PANEL, COMPREHENSIVE     Standing Status:   Future     Standing Expiration Date:   2022    T4, FREE     Standing Status:   Future     Standing Expiration Date:   2022    LIPID PANEL     Standing Status:   Future     Standing Expiration Date:   2022    TSH 3RD GENERATION     Standing Status:   Future     Standing Expiration Date:   2022    REFERRAL TO NEUROLOGY     Referral Priority: Routine     Referral Type:   Consultation     Referral Reason:   Specialty Services Required     Referred to Provider:   Gregorio Finn MD     Number of Visits Requested:   1    methylphenidate ER 36 mg 24 hr tab     Sig: Take 1 Tablet by mouth every morning. Max Daily Amount: 36 mg. Dispense:  90 Tablet     Refill:  0         Management plan & patient instructions reviewed with Dereck Huitron, who voiced understanding. This document may have been created with the aid of dictation software. Text may contain errors, particularly phonetic errors. Ivanna Montejo MD  Internal Medicine, Family Medicine & Sports Medicine  5/27/2021         Subjective   History:   Dereck Huitron is a 23 y.o. female presenting for an annual physical.    Per patient (via [facility]): May 7th I had to wake up at 6 am and I had only slept a total of maybe 3.5-4 hours, so I was somewhat sleep deprived. I realized I had an extremely sharp pain in my lower left abdomen, I was thinking maybe just gas pains. I could barely walk because of how painful it was. I went to grab some medicine from my dad and after I picked up the medicine my memory is completely wiped until I became fully conscious and I was on the floor at the bottom of the stairs. While I was not fully conscious I had to walk 20 feet to the stairs, up one set of 2 stairs then turn and up a few more steps. Then I fell down the stairs Im not sure how or how far up I was but my parents came over and my mom said I was on the floor moving my head and arms in a very robtic way and said I had googly eyes. When I started to become concious again I was extremely confused and fatigued and I tried to just sleep on the floor because moving seemed like too much work. After I came back though my stomach pain was completely gone for some reason (which was great) but I had no clue what happened.  The only way I know everything that happened was because my parents told me bc i genuinely have no memory from it. I dont know if this was a seizure or what it was. Im temporarily not driving just to be safe but Im not sure what to do. I hope you can help in some way even if that means waiting until my appointment to talk more about it! Has not had any episodes since. Mom (who is an RN) is most concerned about what seemed to be a post-ictal state, particularly as she had seizure disorder as a child. Still has not resumed driving. Is doing well in school. Even with lower dose of ADHD rx (was running low for a while, so actually decreased her Rx herself from methylphenidate 72mg to 36mg. Was still performing well, and then when she went back up to 72mg, felt shaky and like it \"was too much\". Happy to be at the 36mg dose now. Plans on going to grad school for special education. Is moving into an appt in July in [place]. Her girlfriend, Jayna Chappell, is currently living with her and her family here in [place]. Isaura plans on returning to [employer] for her masters degree. Past Medical History:   Diagnosis Date    ADD (attention deficit disorder)     Epilepsy ([facility] [address]      History reviewed. No pertinent surgical history. reports that she has never smoked. She has never used smokeless tobacco. She reports that she does not drink alcohol and does not use drugs. Social History     Social History Narrative    [employer] Class of 2022.     Major = elementary education    Menarche @ age 7    Menses regular    (1/11/2019)     Social History     Tobacco Use   Smoking Status Never Smoker   Smokeless Tobacco Never Used     Family History   Problem Relation Age of Onset    Hypertension Father     High Cholesterol Father     Diabetes Maternal Grandmother     Stroke Maternal Grandmother     Heart Attack Maternal Grandmother     Diabetes Maternal Grandfather     Diabetes Paternal Grandmother     Diabetes Paternal Grandfather     Heart Attack Paternal Grandfather      No Known Allergies    Problem List:      Patient Active Problem List    Diagnosis    ADHD (attention deficit hyperactivity disorder), combined type    Controlled substance agreement signed     5/25/2021      Vaccine refused by parent - HPV     Signed refusal form for HPV vaccination on 10/31/2016      Anxiety    Insomnia    Spells of decreased attentiveness     - 5/11/2015 [Dr. Claudio Mendez; peds neuro]: EEGs have been unhelpful, will hold off on any further EEGs at this time, okay for her to start driving with her permit      Vitamin D deficiency       Medications:     Current Outpatient Medications   Medication Sig    methylphenidate ER 36 mg 24 hr tab Take 1 Tablet by mouth every morning. Max Daily Amount: 36 mg.    Cholecalciferol, Vitamin D3, (VITAMIN D3) 1,000 unit cap Take 1 Tab by mouth daily. No current facility-administered medications for this visit. Review of Systems:     Review of Systems         Objective   Physical Assessment:   VS:    Visit Vitals  /76 (BP 1 Location: Left upper arm, BP Patient Position: Sitting, BP Cuff Size: Adult)   Pulse 92   Temp 98.8 °F (37.1 °C) (Temporal)   Resp 16   Ht 5' 3\" (1.6 m)   Wt 133 lb (60.3 kg)   LMP 04/29/2021   SpO2 100%   BMI 23.56 kg/m²       Physical Exam  Nursing note reviewed. Constitutional:       Appearance: She is well-developed. She is not diaphoretic. HENT:      Head: Normocephalic and atraumatic. Right Ear: Tympanic membrane, ear canal and external ear normal.      Left Ear: Tympanic membrane, ear canal and external ear normal.      Nose:      Comments: Mask in place  Neck:      Thyroid: No thyromegaly. Cardiovascular:      Rate and Rhythm: Normal rate and regular rhythm. Heart sounds: No murmur heard. Pulmonary:      Effort: Pulmonary effort is normal.      Breath sounds: Normal breath sounds. No wheezing or rales. Musculoskeletal:         General: Normal range of motion. Cervical back: Neck supple.    Skin: General: Skin is warm and dry. Neurological:      Mental Status: She is alert and oriented to person, place, and time. Cranial Nerves: No cranial nerve deficit. Coordination: Coordination normal.   Psychiatric:         Behavior: Behavior normal.         Thought Content:  Thought content normal.         Judgment: Judgment normal.

## 2021-05-27 NOTE — PROGRESS NOTES
Lizbeth Brock is a 23 y.o. female (: 2000) presenting to address:    Chief Complaint   Patient presents with    Complete Physical    PPD Reading     done Tuesday at 10:30am       Vitals:    21 1103   BP: 119/76   Pulse: 92   Resp: 16   Temp: 98.8 °F (37.1 °C)   TempSrc: Temporal   SpO2: 100%   Weight: 133 lb (60.3 kg)   Height: 5' 3\" (1.6 m)   PainSc:   0 - No pain   LMP: 2021       Hearing/Vision:   No exam data present    Learning Assessment:     Learning Assessment 4/10/2015   PRIMARY LEARNER Patient   BARRIERS PRIMARY LEARNER NONE   CO-LEARNER CAREGIVER Yes   CO-LEARNER NAME Mother   CO-LEARNER HIGHEST LEVEL OF EDUCATION 4 YEARS OF COLLEGE   BARRIERS CO-LEARNER NONE   PRIMARY LANGUAGE ENGLISH   PRIMARY LANGUAGE CO-LEARNER ENGLISH    NEED No   LEARNER PREFERENCE PRIMARY LISTENING   LEARNER PREFERENCE CO-LEARNER LISTENING   LEARNING SPECIAL TOPICS no   ANSWERED BY self   RELATIONSHIP SELF     Depression Screening:     3 most recent PHQ Screens 2021   Little interest or pleasure in doing things Not at all   Feeling down, depressed, irritable, or hopeless Not at all   Total Score PHQ 2 0   In the past year have you felt depressed or sad most days, even if you felt okay? -   Has there been a time in the past month when you have had serious thoughts about ending your life? -   Have you ever in your whole life, tried to kill yourself or made a suicide attempt? -     Fall Risk Assessment:     Fall Risk Assessment, last 12 mths 2017   Able to walk? Yes   Fall in past 12 months? No     Abuse Screening:     Abuse Screening Questionnaire 2021   Do you ever feel afraid of your partner? N   Are you in a relationship with someone who physically or mentally threatens you? N   Is it safe for you to go home? Y     Coordination of Care Questionaire:   1. Have you been to the ER, urgent care clinic since your last visit? Hospitalized since your last visit? NO    2.  Have you seen or consulted any other health care providers outside of the [address] [place] [address] since your last visit? Include any pap smears or colon screening. NO    Advanced Directive:   1. Do you have an Advanced Directive? NO    2. Would you like information on Advanced Directives?  NO

## 2021-05-27 NOTE — Clinical Note
Can you please schedule her for fasting labs? I forgot to order them since we didn't have labs on Thursday, I didn't even think about it. Thank you!

## 2021-06-01 ENCOUNTER — TELEPHONE (OUTPATIENT)
Dept: FAMILY MEDICINE CLINIC | Age: 21
End: 2021-06-01

## 2021-06-04 ENCOUNTER — APPOINTMENT (OUTPATIENT)
Dept: FAMILY MEDICINE CLINIC | Age: 21
End: 2021-06-04

## 2021-06-04 DIAGNOSIS — R68.89 SPELLS OF DECREASED ATTENTIVENESS: ICD-10-CM

## 2021-06-04 DIAGNOSIS — R55 EPISODE OF LOSS OF CONSCIOUSNESS: ICD-10-CM

## 2021-06-04 DIAGNOSIS — Z00.00 ROUTINE ADULT HEALTH MAINTENANCE: ICD-10-CM

## 2021-06-05 LAB
ALBUMIN SERPL-MCNC: 4.7 G/DL (ref 3.9–5)
ALBUMIN/GLOB SERPL: 1.9 {RATIO} (ref 1.2–2.2)
ALP SERPL-CCNC: 60 IU/L (ref 48–121)
ALT SERPL-CCNC: 13 IU/L (ref 0–32)
AST SERPL-CCNC: 15 IU/L (ref 0–40)
BASOPHILS # BLD AUTO: 0 X10E3/UL (ref 0–0.2)
BASOPHILS NFR BLD AUTO: 1 %
BILIRUB SERPL-MCNC: 0.4 MG/DL (ref 0–1.2)
BUN SERPL-MCNC: 6 MG/DL (ref 6–20)
BUN/CREAT SERPL: 9 (ref 9–23)
CALCIUM SERPL-MCNC: 9.6 MG/DL (ref 8.7–10.2)
CHLORIDE SERPL-SCNC: 102 MMOL/L (ref 96–106)
CHOLEST SERPL-MCNC: 156 MG/DL (ref 100–199)
CO2 SERPL-SCNC: 27 MMOL/L (ref 20–29)
CREAT SERPL-MCNC: 0.67 MG/DL (ref 0.57–1)
EOSINOPHIL # BLD AUTO: 0.1 X10E3/UL (ref 0–0.4)
EOSINOPHIL NFR BLD AUTO: 3 %
ERYTHROCYTE [DISTWIDTH] IN BLOOD BY AUTOMATED COUNT: 11.6 % (ref 11.7–15.4)
GLOBULIN SER CALC-MCNC: 2.5 G/DL (ref 1.5–4.5)
GLUCOSE SERPL-MCNC: 86 MG/DL (ref 65–99)
HCT VFR BLD AUTO: 37.2 % (ref 34–46.6)
HDLC SERPL-MCNC: 50 MG/DL
HGB BLD-MCNC: 12.5 G/DL (ref 11.1–15.9)
IMM GRANULOCYTES # BLD AUTO: 0 X10E3/UL (ref 0–0.1)
IMM GRANULOCYTES NFR BLD AUTO: 1 %
IMP & REVIEW OF LAB RESULTS: NORMAL
LDLC SERPL CALC-MCNC: 87 MG/DL (ref 0–99)
LYMPHOCYTES # BLD AUTO: 2.2 X10E3/UL (ref 0.7–3.1)
LYMPHOCYTES NFR BLD AUTO: 50 %
MCH RBC QN AUTO: 30.5 PG (ref 26.6–33)
MCHC RBC AUTO-ENTMCNC: 33.6 G/DL (ref 31.5–35.7)
MCV RBC AUTO: 91 FL (ref 79–97)
MONOCYTES # BLD AUTO: 0.4 X10E3/UL (ref 0.1–0.9)
MONOCYTES NFR BLD AUTO: 9 %
NEUTROPHILS # BLD AUTO: 1.6 X10E3/UL (ref 1.4–7)
NEUTROPHILS NFR BLD AUTO: 36 %
PLATELET # BLD AUTO: 278 X10E3/UL (ref 150–450)
POTASSIUM SERPL-SCNC: 4.2 MMOL/L (ref 3.5–5.2)
PROT SERPL-MCNC: 7.2 G/DL (ref 6–8.5)
RBC # BLD AUTO: 4.1 X10E6/UL (ref 3.77–5.28)
SODIUM SERPL-SCNC: 140 MMOL/L (ref 134–144)
T4 FREE SERPL-MCNC: 1.13 NG/DL (ref 0.82–1.77)
TRIGL SERPL-MCNC: 105 MG/DL (ref 0–149)
TSH SERPL DL<=0.005 MIU/L-ACNC: 3.09 UIU/ML (ref 0.45–4.5)
VLDLC SERPL CALC-MCNC: 19 MG/DL (ref 5–40)
WBC # BLD AUTO: 4.4 X10E3/UL (ref 3.4–10.8)

## 2021-08-11 ENCOUNTER — OFFICE VISIT (OUTPATIENT)
Dept: NEUROLOGY | Age: 21
End: 2021-08-11
Payer: OTHER GOVERNMENT

## 2021-08-11 DIAGNOSIS — R41.840 ATTENTION AND CONCENTRATION DEFICIT: Primary | ICD-10-CM

## 2021-08-11 DIAGNOSIS — R45.89 SYMPTOMS OF DEPRESSION: ICD-10-CM

## 2021-08-11 DIAGNOSIS — F41.9 ANXIETY: ICD-10-CM

## 2021-08-11 PROCEDURE — 90791 PSYCH DIAGNOSTIC EVALUATION: CPT | Performed by: PSYCHOLOGIST

## 2021-08-11 NOTE — PROGRESS NOTES
[address] [facility]  Neuroscience   [address] [facility], [address]  Office:  [phone]  Fax: [phone]                  Initial Office Exam  Patient Name: Matthew Viera  Age: 23 y.o. Gender: female   Handedness: right handed   Presenting Concern: cognitive and psychological concerns  Primary Care Physician: Juan Miguel Rucker MD  Referring Provider: Juan Miguel Rucker MD      REASON FOR REFERRAL:  This comprehensive and medically necessary neuropsychological assessment was requested to assist a differential diagnosis of cognitive and psychological complaints. The use and purpose of this examination, as well as the extent and limitations of confidentiality, were explained prior to obtaining permission to participate. Instructions were provided regarding the necessity to put forth optimal effort and answer questions truthfully in order to obtain reliable and accurate test results. REVIEW OF RECORDS:  Ms. Aly Garcia was referred by her PCP for a psychological evaluation to assist with differential diagnosis. Ms. Aly Garcia has a history of ADHD, combined type, anxiety, and insomnia. Medical history is also significant for vitamin D deficiency and childhood seizures. Due to seizure-like activity on May 7, Ms. Aly Garcia has also been evaluated by Dr. Maria Ines Pelaez, neurologist.    An EEG on 8/5/21 showed the following:  Abnormal EEG recorded during the awake and the asleep states. A photoparoxysmal response was noted during the photic   stimulation. This finding   is a marker of abnormal photosensitivity frequently associated   with   generalized / genetic epilepsy. Clinical correlation is suggested. An MRI on 8/9/21 was unremarkable. Current Outpatient Medications   Medication Sig    methylphenidate ER 36 mg 24 hr tab Take 1 Tablet by mouth every morning.  Max Daily Amount: 36 mg.    Cholecalciferol, Vitamin D3, (VITAMIN D3) 1,000 unit cap Take 1 Tab by mouth daily. No current facility-administered medications for this visit. Past Medical History:   Diagnosis Date    ADD (attention deficit disorder)     Epile[facility])        CLINICAL INTERVIEW:  Ms. Danielle Covarrubias was unaccompanied for her initial evaluation. Consistent with records, she reported a history of childhood epilepsy. She stated that during childhood, she was started on medication for ADHD though no formal work-up was done aside from a questionnaire. She continues to remain on Concerta. Neurologic history is negative for stroke, syncope, and significant head trauma. Developmentally, Ms. 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 As far as she knows, she met developmental milestones one time. No skilled therapies were received. Also consistent with records, Ms. Danielle Covarrubias stated that her last seizure was at the age of 14 though she may have had one in May. She is currently wearing a heart monitor to rule out a cardiology origin for this occurrence. Sleep and pain complaints were denied. There is no significant history of alcohol, tobacco, or illicit substance use. Family history of neurologic illness is significant for stroke in the maternal grandmother and ADHD in the dad and sister. With regard to emotional functioning, Ms. Danielle Covarrubias believes she may be experiencing clinical levels of anxiety. She stated that she saw a therapist while in middle school who also raised concerns for depression. However, she has never been on psychotropic medication. Ms. Danielle Covarrubias stated that she occasionally experiences transient suicidal ideation but denied ever experiencing plan or intent. At the time of this interview, she adamantly denied suicidal ideation, plan, and intent. History is positive for cutting between the ages of [age]. Trauma history is significant for being bullied in school. Socially, Ms. Danielle Covarrubias stated that she has maintained very few friends over time and does not feel lonely.  Family history of psychiatric illness includes possible suicide of the maternal great grandparents and an alcohol use disorder in the maternal grandparents. Socially, Ms. Lottie Kerns lives with her parents. She has never been  and has no children. She has a girlfriend who lives with her and her parents. Hobbies include crocheting and knitting. Ms. Lottie Kerns does not exercise regularly. Ms. Lottie Kerns and her girlfriend will be returning to [employer] in the fall. Ms. Lottie Kerns studies elementary education and hopes to obtain her masters degree subsequent to her bachelors. She indicated that her grades are good and that her GPA is currently a 3.53. She will be starting her senior year in the fall. There is no history of LD. Functionally, Ms. Lottie Kerns remains independent for ADL and IADL care. She will not be driving until November 7 due to the suspected seizure. MENTAL STATUS:    Sensorium  Awake, Aware, Alert   Orientation person, place, time/date, situation, day of week, month of year and year   Relations cooperative and guarded   Eye Contact appropriate   Appearance:  age appropriate   Motor Behavior:  restless   Speech:  monotone   Vocabulary average   Thought Process: within normal limits   Thought Content free of delusions and free of hallucinations   Suicidal ideations none   Homicidal ideations none   Mood:  anxious   Affect:  mood-congruent   Memory recent  adequate   Memory remote:  adequate   Concentration:  adequate   Abstraction:  abstract   Insight:  fair   Reliability fair   Judgment:  fair         DIAGNOSTIC IMPRESSIONS:  1. H/O ADHD  2. S/O Depressed Mood  3. Anxiety       PLAN:  1. Complete a comprehensive neuropsychological assessment to provide a differential diagnosis of presenting concerns as well as to assist with disposition and treatment planning as appropriate. 2. Consider compensatory and remedial cognitive training. 3. Consider nonpharmacological interventions for mood disorder.       [id number] x 1 Review of records. Face to face interview w/ patient. Determine test protocol: 60 minutes. Total 1 unit      Ela Bernstein, PHD  Licensed Clinical Psychologist    This note was created using voice recognition software. Despite editing, there may be syntax errors.

## 2021-08-19 ENCOUNTER — OFFICE VISIT (OUTPATIENT)
Dept: NEUROLOGY | Age: 21
End: 2021-08-19
Payer: OTHER GOVERNMENT

## 2021-08-19 DIAGNOSIS — F90.2 ATTENTION DEFICIT HYPERACTIVITY DISORDER (ADHD), COMBINED TYPE: ICD-10-CM

## 2021-08-19 DIAGNOSIS — Z65.8 INADEQUATE SOCIAL SUPPORT: ICD-10-CM

## 2021-08-19 DIAGNOSIS — F41.1 GAD (GENERALIZED ANXIETY DISORDER): ICD-10-CM

## 2021-08-19 DIAGNOSIS — F43.9 TRAUMA AND STRESSOR-RELATED DISORDER: ICD-10-CM

## 2021-08-19 DIAGNOSIS — R41.89 THOUGHT DISORDER: ICD-10-CM

## 2021-08-19 DIAGNOSIS — F33.1 MODERATE EPISODE OF RECURRENT MAJOR DEPRESSIVE DISORDER (HCC): Primary | ICD-10-CM

## 2021-08-19 PROCEDURE — 96136 PSYCL/NRPSYC TST PHY/QHP 1ST: CPT | Performed by: PSYCHOLOGIST

## 2021-08-19 PROCEDURE — 96138 PSYCL/NRPSYC TECH 1ST: CPT | Performed by: PSYCHOLOGIST

## 2021-08-19 PROCEDURE — 96137 PSYCL/NRPSYC TST PHY/QHP EA: CPT | Performed by: PSYCHOLOGIST

## 2021-08-19 PROCEDURE — 96139 PSYCL/NRPSYC TST TECH EA: CPT | Performed by: PSYCHOLOGIST

## 2021-08-19 PROCEDURE — 96130 PSYCL TST EVAL PHYS/QHP 1ST: CPT | Performed by: PSYCHOLOGIST

## 2021-08-19 PROCEDURE — 96131 PSYCL TST EVAL PHYS/QHP EA: CPT | Performed by: PSYCHOLOGIST

## 2021-08-20 NOTE — PROGRESS NOTES
[address] [facility]  Neuroscience   [facility] [facility] [place], [facility] [address]  Office:  [phone]  Fax: [phone]                Psychological Evaluation Report    Patient Name: Ly Reyes  Age: 23 y.o. Gender: female   Handedness: right handed   Presenting Concern: cognitive and psychological concerns  Primary Care Physician/Referring Provider: Guadalupe Bynum MD    PATIENT HISTORY (OBTAINED DURING INITIAL CLINICAL EVALUATION):    REASON FOR REFERRAL:  This comprehensive and medically necessary neuropsychological assessment was requested to assist a differential diagnosis of cognitive and psychological complaints. The use and purpose of this examination, as well as the extent and limitations of confidentiality, were explained prior to obtaining permission to participate. Instructions were provided regarding the necessity to put forth optimal effort and answer questions truthfully in order to obtain reliable and accurate test results. REVIEW OF RECORDS:  Ms. Amie Aguilar was referred by her PCP for a psychological evaluation to assist with differential diagnosis. Ms. Amie Aguilar has a history of ADHD, combined type, anxiety, and insomnia. Medical history is also significant for vitamin D deficiency and childhood seizures. Due to seizure-like activity on May 7, Ms. Amie Aguilar has also been evaluated by Dr. Cory Gann, neurologist.    An EEG on 8/5/21 showed the following:  Abnormal EEG recorded during the awake and the asleep states. A photoparoxysmal response was noted during the photic stimulation. This finding   is a marker of abnormal photosensitivity frequently associated with generalized / genetic epilepsy. An MRI on 8/9/21 was unremarkable. Current Outpatient Medications   Medication Sig    methylphenidate ER 36 mg 24 hr tab Take 1 Tablet by mouth every morning.  Max Daily Amount: 36 mg.    Cholecalciferol, Vitamin D3, (VITAMIN D3) 1,000 unit cap Take 1 Tab by mouth daily. No current facility-administered medications for this visit. Past Medical History:   Diagnosis Date    ADD (attention deficit disorder)     Epilepsy [facility])        CLINICAL INTERVIEW:  Ms. Teri Byrnes was unaccompanied for her initial evaluation. Consistent with records, she reported a history of childhood epilepsy. She stated that during childhood, she was started on medication for ADHD though no formal work-up was done aside from a questionnaire. She continues to remain on Concerta. Neurologic history is negative for stroke, syncope, and significant head trauma. Developmentally, Ms. 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 As far as she knows, she met developmental milestones on time. No skilled therapies were received. Also consistent with records, Ms. Teri Byrnes stated that her last seizure was at the age of 14 though she may have had one in May. She is currently wearing a heart monitor to rule out a cardiology origin for this occurrence. Sleep and pain complaints were denied. There is no significant history of alcohol, tobacco, or illicit substance use. Family history of neurologic illness is significant for stroke in the maternal grandmother and ADHD in the dad and sister. With regard to emotional functioning, Ms. Teri Byrnes believes she may be experiencing clinical levels of anxiety. She stated that she saw a therapist while in middle school who also raised concerns for depression. However, she has never been on psychotropic medication. Ms. Teri Byrnes stated that she occasionally experiences transient suicidal ideation but denied ever experiencing plan or intent. At the time of this interview, she adamantly denied suicidal ideation, plan, and intent. History is positive for cutting between the ages of [age]. Trauma history is significant for being bullied in school. Socially, Ms. Teri Byrnes stated that she has maintained very few friends over time and does not feel lonely.  Family history of psychiatric illness includes possible suicide of the maternal great grandparents and an alcohol use disorder in the maternal grandparents. Socially, Ms. Paddy Portillo lives with her parents. She has never been  and has no children. She has a girlfriend who lives with her and her parents. Hobbies include crocheting and knitting. Ms. Paddy Portillo does not exercise regularly. Ms. Paddy Portillo and her girlfriend will be returning to [employer] in the Fall. Ms. Paddy Portillo studies elementary education and hopes to obtain her masters degree subsequent to her bachelors. She indicated that her grades are good and that her GPA is currently a 3.53. She will be starting her senior year in the fall. There is no history of LD. Functionally, Ms. Paddy Portillo remains independent for ADL and IADL care. She will not be driving until November 7 due to the suspected seizure.     MENTAL STATUS:    Sensorium  Awake, Aware, Alert   Orientation person, place, time/date, situation, day of week, month of year and year   Relations cooperative and guarded   Eye Contact appropriate   Appearance:  age appropriate   Motor Behavior:  restless   Speech:  monotone   Vocabulary average   Thought Process: within normal limits   Thought Content free of delusions and free of hallucinations   Suicidal ideations none   Homicidal ideations none   Mood:  anxious   Affect:  mood-congruent   Memory recent  adequate   Memory remote:  adequate   Concentration:  adequate   Abstraction:  abstract   Insight:  fair   Reliability fair   Judgment:  fair     METHODS OF ASSESSMENT (Current Evaluation):  Clinician Administered:  Pacheco Anxiety Scale (JOSE)  Pacheco Depression Scale-II (BDI-II)  Detailed Assessment of Posttraumatic Stress (DAPS)  Personality Assessment Inventory (KELL-2)  Social Responsiveness Scale, Second Edition (SRS-2)    Technician Administered (Prashant Estevez):  Neisha Cisneros Developmental Test of Visual-Motor Integration-Sixth Edition  Category Fluency for Animals  Controlled Oral Word Association Test  Finger Oscillation Test  Grooved Peg Board Test  Hand Dynamometer Test  LETICIA-2 Continuous Performance Test  Neuropsychological Assessment Battery-Memory Module and Select Subtests  Reliable Digit Span  Trailmaking Test  Wechsler Adult Intelligence Scale-IV  [place] Card Sorting Test    TEST OBSERVATIONS:  Ms. Hoa Queen arrived promptly for the testing session. Dress and grooming were appropriate; physical presentation was unchanged from that observed during the clinical interview. Speech was fluent and coherent with normal rate, rhythm, tone, and volume. No expressive or receptive language deficits were noted. Some fidgetiness was observed. Thought process was logical, relevant, and focused. Thought content showed no apparent delusional ideation. Auditory and visual hallucinations were denied and there was no obvious response to internal stimuli. Affect was congruent with the anxious, depressed, and withdrawn mood conveyed. Ms. Hoa Queen was adequately cooperative and appeared to put forth good effort throughout this examination. Rapport with the examiner was adequately established and maintained. Minimal prompting was required. Comprehension of test instructions was not problematic. Performance motivation was objectively measured with one instrument (Reliable Digit Span); Ms. Hoa Queen produced na ormal score on this measure. Accordingly, test findings below do not appear to be the product of disingenuous effort. Given the above observations, plus comments contained in the Mental Status section, the results of this examination are regarded as reasonably reliable and valid. TEST RESULTS:  Quantitative test results are derived from comparisons to age and education corrected cohort normative data, where applicable. Percentiles are included in these instances. Qualitative test results are determined using clinical observations.              General Orientation and Awareness:       Orientation to person yes   Time yes  Place yes  Circumstance yes                     Sensory-Perceptual and Motor Functioning:    Visual and auditory acuity:  Glasses; Within functional limits       Gait and balance:   Ambulated independently                                         Classification:  Manual dexterity right dominant hand (58 percentile)                      Average   Manual dexterity left nondominant hand (46 percentile)                      Average  Simple fine motor speed right dominant hand (2 percentile)           Moderately Impaired  Simple fine motor speed left nondominant hand (18 percentile)        Low Average   strength right dominant hand (62 percentile)                  Average   strength left nondominant hand (38 percentile)       Average    Attention/Concentration:       Simple visuomotor tracking (89 percentile)                  Above Average     On a continuous performance test, the profile was consistent with ADHD, combined type.     Visuospatial and Constructional Praxis:     Visual-Motor Integration **adjusted        Average           Language:         Phonemic verbal fluency (1 percentile)                                 Severely Impaired   Categorical verbal fluency (1 percentile)                              Severely Impaired    Memory and Learning:       Word list immediate recall (12 percentile)                  Low Average  Word list short delayed recall (34 percentile)                  Average  Word list long delayed recall (21 percentile)                             Low Average  Forced Choice Recognition (4 percentile)       Moderately Impaired  Shape learning immediate recognition (84 percentile)       Above Average    Shape learning delayed recognition (76 percentile)                 Above Average  Forced Choice Recognition (21 percentile)        Low Average  Story learning immediate recall (3 percentile)        Moderately Impaired  Story learning delayed recall (12 percentile)                             Low Average    Cognitive Tests of Executive Functioning:     Ability to think flexibly, Trailmaking B (73 percentile)                 Average  Conceptual problem solving                                                                Categories Completed (>16 percentile)         Average        Trials to Complete First Category (>16 percentile)                 Average        Failure to Maintain Set (>16 percentile)        Average   Learning to Learn (>16 percentile)        Average    Intellectual and Basic Cognitive Functioning (WAIS-IV)  Verbal Comprehension Index: 102 Percentile: 55      Average   Similarities: 11    Percentile: 63      Vocabulary: 11    Percentile: 63           Information: 9    Percentile: 37     Perceptual Reasoning Index: 127  Percentile: 96      Superior   Block Design: 16   Percentile: 98      Matrix Reasonin   Percentile: 91           Visual Puzzles: 14   Percentile: 91     Working Memory Index: 97  Percentile: 42      Average   Digit Span: 7    Percentile: 16      Arithmetic: 12    Percentile: 75     Processing Speed: 100   Percentile: 50      Average   Symbol Search: 12   Percentile: 75      Codin    Percentile: 25     Full Scale IQ: 109    Percentile: 73      Average    Emotional and Social Functioning:  Screening of Emotional/Psychiatric Status:  Level of self-reported anxiety    (19/63)     Moderate  Level of self-reported depression   (27/63)     Moderate    On a measure of social responsiveness, the profile indicated deficiencies in reciprocal social behavior. Such profiles are strongly associated with a clinical diagnosis of autism spectrum disorder. On a measure of symptomatic responding to a specific traumatic event, the profile was not consistent with PTSD or ASD, despite a significant trauma history. Nonetheless, Ms. Forest Akins did report significant levels of posttraumatic reexperiencing, which is suggestive of posttraumatic stress that fall short of a diagnosable disorder. Individuals with such clinical presentations, although not meeting the full criteria for PTSD/ASD, may suffer considerable distress and often benefit from psychological treatment and/or pharmacotherapy. On a measure of general emotional functioning, Ms. Agustin Payan reported unusual perceptual events in addition to blocked thoughts. She also reported significant depressive symptomatology including cognitive and affective features along with multifaceted anxiety including worry, tension, difficulty relaxing, specific fears, phobic behaviors, and traumatic stress. With regard to self-concept, Ms. Agustin Payan appears to maintain a generally harsh, negative self evaluation. Interpersonally, she characterized herself as very uncomfortable in social situations. She appears to have very little interest or need for interacting with others and may for the most part take a passive, submissive stance when dealing with others. IMPRESSIONS/RECOMMENDATIONS:  Test performance did indeed provide support for ADHD, combined type. With regard to treatment, I would suggest consideration for a nonstimulant as underlying anxiety appears significant. Along with anxiety, Ms. Agustin Payan appears to be experiencing significant depressive symptomatology. Furthermore, she reported deficiencies in reciprocal social behavior, social anxiety, and minimal interest in social interactions. While this could point to an autistic spectrum disorder, test performance was not necessarily consistent with the cognitive profile typically associated with ASD. That said, Ms. Morales's social tendencies, taken with the unusual perceptual events that she endorsed, also raise the possibility of an underlying thought disorder.  Ms. Agustin Payan is indeed at an age where a diagnosis of this kind would be expected to manifest.  For this reason, I would recommend a psychiatric consultation and psychotherapy. Should Ms. Morales be interested in mental health services, a [employer] will be provided the time of feedback. Though Ms. Paddy Portillo has been able to sustain academic success, it would seem that her mental health, taken with her ADHD, may present some difficulties going forward. For that reason, it is suggested that she contact the Office of Disability Services at her college so that her eligibility for academic accommodations can be considered. DIAGNOSTIC IMPRESSIONS:  1. Major Depressive Disorder, moderate  2. R/O Thought Disorder  3. Generalized Anxiety Disorder  4. Trauma and Stressor-Related Disorder  5. ADHD, combined type  6. Inadequate social support    Thank you for allowing me the opportunity to assist you in Ms. Morales's care. Please do not hesitate to contact me should you have additional questions that I may not have addressed. 96136 x 1  96137 x 1  96138 x 1  96139 x 4  96130 x 1  96131 x 1    [facility] Jose Carlos Lester, Ph.D.   Licensed Clinical Psychologist        Time Documentation:     50269 x 1   94165 x 1  Neuropsych testing/data gathering by Neuropsychologist: (1 hour), 96136 x 1 (30 minutes), 96137 x 1 (additional 30 minutes)     96138 x 1  96139 x 4 Test Administration/Data Gathering By Technician: (2.5 hours). 95805 x 1 (first 30 minutes), 96139 x 4 (each additional 30 minutes)     96130 x 1  96131 x 1 Testing Evaluation Services by Neuropsychologist (1 hour, 50 minutes) 96130 x 1 (first hour), 96131 x 1 (50 minutes)     The above includes: Record review. Review of history provided by patient. Review of collaborative information. Testing by Clinician. Review of raw data. Scoring. Report writing of individual tests administered by Clinician.   Integration of individual tests administered by psychometrist with NSE/testing by clinician, review of records/history/collaborative information, case Conceptualization, treatment planning, clinical decision making, report writing, coordination Of Care. Psychometry test codes as time spent by psychometrist administering and scoring neurocognitive/psychological tests under supervision of neuropsychologist.  Integral services including scoring of raw data, data interpretation, case conceptualization, report writing etcetera were initiated after the patient finished testing/raw data collected and was completed on the date the report was signed. This note will not be viewable in [address] This note was created using voice recognition software. Despite editing, there may be syntax errors. I have reviewed the documentation provided by Dr. Raúl Cameron, PhD, [facility]. Dr. Sherrell Smith is in her second year of Fellowship in Clinical Neuropsychology. Dr. Sherrell Smith is licensed and credentialed to practice in the [facility], is providing the current services via her NPI and licensure, and had been providing similar services prior to her employment with [address] [place] [address]. No additional insurance billing is done by me on her cases, my NPI is not being used, etc.   I have not had any face to face engagement with her patients, and am providing supervision and consultation services with her as she works towards advancing her career and subspecialities. I have reviewed the history, the neurocognitive and psychological test results, the medical records available, and the impressions and recommendations generated by Dr. Sherrell Smith. We have engaged in peer to peer supervision as needed. I have reviewed history noted in the records, the tests administered, the test scores, and the overall case history and profile and report generated by Dr. Sherrell Smith. Dr. Wheeler Manuelito clinical case formulation, diagnostic impressions, and the proposed management plans/treatment recommendations are her own and based on her clinical training, level of expertise, and judgment.   Any additional comments, concerns, or recommendations that I am making are offered here:  I concur that there is an underlying psychiatric issue that is not yet fully clarified. That needs to be done first.  Then, suggest tx for the ADHD which is clearly present. ASD is possible, but the neurocognitive profile is not entirely consistent with same. There is symptom overlay here. MARIELA Lackey  Neuropsychology

## 2021-09-10 ENCOUNTER — VIRTUAL VISIT (OUTPATIENT)
Dept: NEUROLOGY | Age: 21
End: 2021-09-10
Payer: OTHER GOVERNMENT

## 2021-09-10 DIAGNOSIS — F43.9 TRAUMA AND STRESSOR-RELATED DISORDER: ICD-10-CM

## 2021-09-10 DIAGNOSIS — Z65.8 INADEQUATE SOCIAL SUPPORT: ICD-10-CM

## 2021-09-10 DIAGNOSIS — F90.2 ATTENTION DEFICIT HYPERACTIVITY DISORDER (ADHD), COMBINED TYPE: ICD-10-CM

## 2021-09-10 DIAGNOSIS — F41.1 GAD (GENERALIZED ANXIETY DISORDER): ICD-10-CM

## 2021-09-10 DIAGNOSIS — F33.1 MODERATE EPISODE OF RECURRENT MAJOR DEPRESSIVE DISORDER (HCC): Primary | ICD-10-CM

## 2021-09-10 DIAGNOSIS — R41.89 THOUGHT DISORDER: ICD-10-CM

## 2021-09-10 DIAGNOSIS — R41.840 ATTENTION AND CONCENTRATION DEFICIT: ICD-10-CM

## 2021-09-10 PROCEDURE — 90832 PSYTX W PT 30 MINUTES: CPT | Performed by: PSYCHOLOGIST

## 2021-09-10 NOTE — PROGRESS NOTES
Interactive Psychotherapy/office feedback        Interactive office feedback session with Ms. Morales. I reviewed the results of the recent Neuropsychological Evaluation  including the observed areas of neurocognitive strengths and weaknesses. Education was provided regarding my diagnostic impressions, and treatment plan/options were discussed. I also answered numerous questions related to the clinical findings, including the various methods to improve cognition and mood. CBT, psychoeducation, and supportive psychotherapy techniques were utilized. Prior to seeing the patient I reviewed the records, including the previously completed report, the records in [place], and any updated visits from other providers since I saw the patient last.       Diagnoses:      1. Major Depressive Disorder, moderate  2. R/O Thought Disorder  3. Generalized Anxiety Disorder  4. Trauma and Stressor-Related Disorder  5. ADHD, combined type  6. Inadequate social support    The patient will follow up with the referring provider, and reported being very pleased with the services provided. Follow up with [facility] 12 months. [address] psychotherapy 30 Minutes      This note was created using voice recognition software. Despite editing, there may be syntax errors. Nikita Emery is a 23 y.o. female who was evaluated by an audio-video encounter for concerns as above. Patient identification was verified prior to start of the visit. Pursuant to the emergency declaration under the [address]5 waiver authority and the [employer] and [employer] Act, this Virtual Visit was conducted, with patient's (and/or legal guardian's) consent, to reduce the patient's risk of exposure to COVID-19 and provide necessary medical care. Services were provided through a synchronous discussion virtually to substitute for in-person clinic visit. I was at home.  The patient was at home.

## 2021-10-24 ENCOUNTER — PATIENT MESSAGE (OUTPATIENT)
Dept: SPORTS MEDICINE | Age: 21
End: 2021-10-24

## 2021-10-24 DIAGNOSIS — F90.2 ADHD (ATTENTION DEFICIT HYPERACTIVITY DISORDER), COMBINED TYPE: ICD-10-CM

## 2021-10-25 NOTE — TELEPHONE ENCOUNTER
From: Tiffanie Lyon  To: Vinicio Gastelum MD  Sent: 10/24/2021 11:35 AM EDT  Subject: Prescription Question    Hi Dr. Oscar Lynch,     I dont know if you got my message from last week but I'm almost out of medicine, if I can't change my medicine to something of a non-stimulat yet that is okay I just really want to make sure I have some medicine. If the problem is that you can't prescribe me medicine anymore can you let me know. Thank you!   Tiffanie Lyon

## 2021-10-26 RX ORDER — METHYLPHENIDATE HYDROCHLORIDE 36 MG/1
36 TABLET ORAL
Qty: 90 TABLET | Refills: 0 | Status: SHIPPED | OUTPATIENT
Start: 2021-10-26 | End: 2022-01-11 | Stop reason: SDUPTHER

## 2021-10-26 NOTE — TELEPHONE ENCOUNTER
Last PDMP Jordyn Chadwick as Reviewed:  Review User Review Instant Review Result   Wilman Khan 10/26/2021  2:35 PM Reviewed PDMP [1]       Orders Placed This Encounter    methylphenidate ER 36 mg 24 hr tab     Sig: Take 1 Tablet by mouth every morning. Max Daily Amount: 36 mg.      Dispense:  90 Tablet     Refill:  0

## 2022-01-11 ENCOUNTER — PATIENT MESSAGE (OUTPATIENT)
Dept: SPORTS MEDICINE | Age: 22
End: 2022-01-11

## 2022-01-11 DIAGNOSIS — F90.2 ADHD (ATTENTION DEFICIT HYPERACTIVITY DISORDER), COMBINED TYPE: ICD-10-CM

## 2022-01-11 RX ORDER — METHYLPHENIDATE HYDROCHLORIDE 36 MG/1
36 TABLET ORAL
Qty: 90 TABLET | Refills: 0 | Status: SHIPPED | OUTPATIENT
Start: 2022-01-11 | End: 2022-04-06 | Stop reason: DRUGHIGH

## 2022-01-11 NOTE — TELEPHONE ENCOUNTER
Orders Placed This Encounter    methylphenidate ER 36 mg 24 hr tab     Sig: Take 1 Tablet by mouth every morning. Max Daily Amount: 36 mg. Dispense:  90 Tablet     Refill:  0       Last PDMP Matthew as Reviewed:  Review User Review Instant Review Result   Casi Myles 1/11/2022  4:25 PM Reviewed PDMP [1]       Yoan Campa still waiting on Dr. Carmen Cook to be credentialed with [address] [place] [address].

## 2022-03-19 PROBLEM — Z79.899 CONTROLLED SUBSTANCE AGREEMENT SIGNED: Status: ACTIVE | Noted: 2020-03-14

## 2022-04-05 NOTE — PROGRESS NOTES
Note to patient:  The purpose of this note is to communicate optimally with the other physicians / APCs involved in your care. It is written using standard medical terminology. If you have questions regarding the details of the note, please contact my office to schedule an appointment to address your questions. Guera Chapman  Primary Care Office Visit - Problem-Oriented    : 2000   Kendall Boucher is a 24 y.o. female presenting for  Chief Complaint   Patient presents with    Transfer Of Care    Medication Evaluation          Assessment/Plan:       ICD-10-CM ICD-9-CM   1. Establishing care with new doctor, encounter for  Z76.89 V65.8   2. ADHD (attention deficit hyperactivity disorder), combined type  F90.2 314.01   3. Controlled substance agreement signed  Z79.899 V58.69   4. Medication monitoring encounter  Z51.81 V58.83   5. Anxiety and depression  F41.9 300.00    F32. A 311     #ADHD  With #Anxiety and Depression - chronic, uncontrolled  Diagnosed in elementary school and has been on stimulant treatment since with good effect on symptoms. Denies notable AE. Established with Neuropsychiatry, rec review last visit 9/10/21  CSA reviewed and signed in office 22, [employer] 22  Taking daily because doesn't tolerate well when restarting medication after break. Interested in trial of nonstimulant treatment for ADHD to help reduce concurrent anxiety symptoms. Reviewed options of Atomoxetine and Guanfacine. Agreeable to plan of reducing Methylphenidate while starting Atomoxetine and Lexapro. After 1-2mo can reassess and determine if anxiety control improved and best next steps.      2022   1  Methylphenidate Er 36 Mg Tab   90.00  90  Jahaira Gannon  [id number]  [place] ([id number])  0   [place]     2021  10/26/2021   2  Methylphenidate Er 36 Mg Tab   90.00  90  Patricia Salcedo  [id number]          Last PDMP [place] as Reviewed:  Review User Review Instant Review Result   Golden Bhatti 4/5/2022  7:06 AM Reviewed PDMP [1]     Interested in starting treatment to reduce frequency/severity of anxiety symptoms  Reviewed risk/benefit of both preventative daily treatment and rescue (prn) medication. Reviewed that it could take 4-6wks before improvement noted after initiation of preventative daily medication and likely need to increase dose if tolerating well to reach therapeutic level/treatment goals. Counseled on nonpharmacologic interventions that could improve outcomes as well including exercise, therapy/counseling, and self care. Prior drug trials:   None  Current regimen and adjustments:    Starting Lexapro (will increase to 10mg after first week of treatment if tolerating)    Key Psychotherapeutic Meds             escitalopram oxalate (LEXAPRO) 5 mg tablet (Taking) Initial dose: Take 1 tablet by mouth daily. If tolerating well after 1 week, can continue with higher dose of Escitalopram as prescribed. escitalopram oxalate (LEXAPRO) 10 mg tablet (Taking) Take 1 Tablet by mouth daily. atomoxetine (STRATTERA) 40 mg capsule (Taking) Take 1 Capsule by mouth daily. Can increase dose to 2 capsules by mouth daily if limited improvement in symptoms after 1 week of use. Indications: attention deficit disorder with hyperactivity    methylphenidate HCl 15 mg SC40 (Taking/Discontinued) Take 15 mg by mouth daily for 29 days. Max Daily Amount: 15 mg. #HM  Reviewed current vaccination recommendations and would offer additional information if interested. Cervical CA screening - Encouraged to schedule PAP in office when able. Orders Placed This Encounter    [id number] (MW)     Standing Status:   Future     Number of Occurrences:   1     Standing Expiration Date:   4/7/2023    escitalopram oxalate (LEXAPRO) 5 mg tablet     Sig: Initial dose: Take 1 tablet by mouth daily. If tolerating well after 1 week, can continue with higher dose of Escitalopram as prescribed.      Dispense:  7 Tablet     Refill:  0    escitalopram oxalate (LEXAPRO) 10 mg tablet     Sig: Take 1 Tablet by mouth daily. Dispense:  90 Tablet     Refill:  1    atomoxetine (STRATTERA) 40 mg capsule     Sig: Take 1 Capsule by mouth daily. Can increase dose to 2 capsules by mouth daily if limited improvement in symptoms after 1 week of use. Indications: attention deficit disorder with hyperactivity     Dispense:  90 Capsule     Refill:  0     May adjust next Rx dose    DISCONTD: methylphenidate HCl 15 mg SC40     Sig: Take 15 mg by mouth daily for 29 days. Max Daily Amount: 15 mg. Dispense:  30 Capsule     Refill:  0     Follow-up and Dispositions    · Return in about 1 month (around 5/6/2022) for for adjustment of medication (virtual ok). Follow-up and Disposition History     Reviewed management plan & instructions with patient, who voiced understanding. Subjective   History:   Sharonda Reeves is a 24 y.o. female presenting to address:  Chief Complaint   Patient presents with    Transfer Of Care    Medication Evaluation     Extensive review of medical history and medications completed to facilitate transfer of care. Current Outpatient Medications   Medication Sig    escitalopram oxalate (LEXAPRO) 5 mg tablet Initial dose: Take 1 tablet by mouth daily. If tolerating well after 1 week, can continue with higher dose of Escitalopram as prescribed.  escitalopram oxalate (LEXAPRO) 10 mg tablet Take 1 Tablet by mouth daily.  atomoxetine (STRATTERA) 40 mg capsule Take 1 Capsule by mouth daily. Can increase dose to 2 capsules by mouth daily if limited improvement in symptoms after 1 week of use. Indications: attention deficit disorder with hyperactivity    Cholecalciferol, Vitamin D3, (VITAMIN D3) 1,000 unit cap Take 1 Tab by mouth daily.  [START ON 6/13/2022] methylphenidate HCl 20 mg SC40 Take 20 mg by mouth daily for 29 days. Max Daily Amount: 20 mg.      No current facility-administered medications for this visit. Review of Systems:     A comprehensive review of systems was negative except for that written in the HPI and A/P         Objective     Physical Assessment:     Visit Vitals  BP 97/64 (BP 1 Location: Left upper arm, BP Patient Position: Sitting, BP Cuff Size: Adult)   Pulse 83   Temp 98.6 °F (37 °C) (Temporal)   Resp 16   Ht 5' 3\" (1.6 m)   Wt 133 lb 3.2 oz (60.4 kg)   LMP 03/15/2022 (Exact Date)   SpO2 100%   BMI 23.60 kg/m²       Physical Exam  Vitals and nursing note reviewed. Constitutional:       General: She is not in acute distress. Interventions: Face mask in place. Cardiovascular:      Rate and Rhythm: Normal rate and regular rhythm. Pulses: Normal pulses. Pulmonary:      Effort: Pulmonary effort is normal. No respiratory distress. Musculoskeletal:      Right lower leg: No edema. Left lower leg: No edema. Neurological:      Mental Status: She is alert and oriented to person, place, and time. Gait: Gait normal.   Psychiatric:         Mood and Affect: Mood normal.         Behavior: Behavior normal.         Thought Content: Thought content normal.         Judgment: Judgment normal.                 This document may have been created with the aid of dictation software. Text may contain errors, particularly phonetic errors.       Nuvia Isaac DO  Family Medicine  04/06/2022

## 2022-04-06 ENCOUNTER — OFFICE VISIT (OUTPATIENT)
Dept: FAMILY MEDICINE CLINIC | Age: 22
End: 2022-04-06
Payer: COMMERCIAL

## 2022-04-06 VITALS
WEIGHT: 133.2 LBS | HEIGHT: 63 IN | RESPIRATION RATE: 16 BRPM | DIASTOLIC BLOOD PRESSURE: 64 MMHG | SYSTOLIC BLOOD PRESSURE: 97 MMHG | HEART RATE: 83 BPM | TEMPERATURE: 98.6 F | BODY MASS INDEX: 23.6 KG/M2 | OXYGEN SATURATION: 100 %

## 2022-04-06 DIAGNOSIS — F41.9 ANXIETY AND DEPRESSION: ICD-10-CM

## 2022-04-06 DIAGNOSIS — Z76.89 ESTABLISHING CARE WITH NEW DOCTOR, ENCOUNTER FOR: Primary | ICD-10-CM

## 2022-04-06 DIAGNOSIS — Z79.899 CONTROLLED SUBSTANCE AGREEMENT SIGNED: ICD-10-CM

## 2022-04-06 DIAGNOSIS — Z51.81 MEDICATION MONITORING ENCOUNTER: ICD-10-CM

## 2022-04-06 DIAGNOSIS — F32.A ANXIETY AND DEPRESSION: ICD-10-CM

## 2022-04-06 DIAGNOSIS — F90.2 ADHD (ATTENTION DEFICIT HYPERACTIVITY DISORDER), COMBINED TYPE: ICD-10-CM

## 2022-04-06 PROCEDURE — 99214 OFFICE O/P EST MOD 30 MIN: CPT | Performed by: STUDENT IN AN ORGANIZED HEALTH CARE EDUCATION/TRAINING PROGRAM

## 2022-04-06 RX ORDER — METHYLPHENIDATE HYDROCHLORIDE 15 MG/1
15 CAPSULE, EXTENDED RELEASE ORAL DAILY
Qty: 30 CAPSULE | Refills: 0 | Status: SHIPPED | OUTPATIENT
Start: 2022-04-06 | End: 2022-04-11 | Stop reason: CLARIF

## 2022-04-06 RX ORDER — ESCITALOPRAM OXALATE 5 MG/1
TABLET ORAL
Qty: 7 TABLET | Refills: 0 | Status: SHIPPED | OUTPATIENT
Start: 2022-04-06 | End: 2022-05-09 | Stop reason: DRUGHIGH

## 2022-04-06 RX ORDER — ATOMOXETINE 40 MG/1
40 CAPSULE ORAL DAILY
Qty: 90 CAPSULE | Refills: 0 | Status: SHIPPED | OUTPATIENT
Start: 2022-04-06 | End: 2022-05-09 | Stop reason: SDUPTHER

## 2022-04-06 RX ORDER — ESCITALOPRAM OXALATE 10 MG/1
10 TABLET ORAL DAILY
Qty: 90 TABLET | Refills: 1 | Status: SHIPPED | OUTPATIENT
Start: 2022-04-06 | End: 2022-05-09 | Stop reason: SDUPTHER

## 2022-04-06 NOTE — LETTER
CONTROLLED SUBSTANCE MEDICATION AGREEMENT  Patient Name: Kayce Alvarez  Patient YOB: 2000     I understand, that controlled substance medications may be used to help better manage my symptoms and to improve my ability to function at home, work and in social settings. However, I also understand that these medications do have risks, which have been discussed with me, including possible development of physical or psychological dependence. I understand that successful treatment requires mutual trust and honesty between me and my provider. I understand and agree that following this Medication Agreement is necessary in continuing my provider-patient relationship and the success of my treatment plan. Explanation from my Provider: Benefits and Goals of Controlled Substance Medications: There are two potential goals for your treatment: (1) decreased pain and suffering (2) improved daily life functions. There are many possible treatments for your chronic condition(s). Alternatives such as physical therapy, yoga, massage, home daily exercise, meditation, relaxation techniques, injections, chiropractic manipulations, surgery, cognitive therapy, hypnosis and many medications that are not habit-forming may be used. Use of controlled substance medications may be helpful, but they are unlikely to resolve all symptoms or restore all function. Explanation from my Provider: Risks of Controlled Substance Medications:   Opioid pain medications: These medications can lead to problems such as addiction/dependence, sedation, lightheadedness/dizziness, memory issues, falls, constipation, nausea, or vomiting. They may also impair the ability to drive or operate machinery. Additionally, these medications may lower testosterone levels, leading to loss of bone strength, stamina and sex drive.   They may cause problems with breathing, sleep apnea and reduced coughing, which is especially dangerous for patients with lung disease. Overdose or dangerous interactions with alcohol and other medications may occur, leading to death. Hyperalgesia may develop, which means patients receiving opioids for the treatment of pain may become more sensitive to certain painful stimuli, and in some cases, experience pain from ordinarily non-painful stimuli. Women between the ages of 14-53 who could become pregnant should carefully weigh the risks and benefits of opioids with their physicians, as these medications increase the risk of pregnancy complications, including miscarriage,  delivery and stillbirth. It is also possible for babies to be born addicted to opioids. Opioid dependence withdrawal symptoms may include; feelings of uneasiness, increased pain, irritability, belly pain, diarrhea, sweats and goose-flesh. Testosterone replacement therapy:  Potential side effects include increased risk of stroke and heart attack, blood clots, increased blood pressure, increased cholesterol, enlarged prostate, sleep apnea, irritability/aggression and other mood disorders, and decreased fertility. Kendall Boucher (2000)             Page 1 of 4    Initials:_______    Benzodiazepines and non-benzodiazepine sleep medications: These medications can lead to problems such as addiction/dependence, sedation, fatigue, lightheadedness, dizziness, incoordination, falls, depression, hallucinations, and impaired judgment, memory and concentration. The ability to drive and operate machinery may also be affected. Abnormal sleep-related behaviors have been reported, including sleepwalking, driving, making telephone calls, eating, or having sex while not fully awake. These medications can suppress breathing and worsen sleep apnea, particularly when combined with alcohol or other sedating medications, potentially leading to death. Dependence withdrawal symptoms may include tremors, anxiety, hallucinations and seizures.    Stimulants:  Common adverse effects include addiction/dependence, increased blood pressure and heart rate, decreased appetite, nausea, involuntary weight loss, insomnia,  irritability, and headaches. These risks may increase when these medications are combined with other stimulants, such as caffeine pills or energy drinks, certain weight loss supplements and oral decongestants. Dependence withdrawal symptoms may include depressed mood, loss of interest, suicidal thoughts, anxiety, fatigue, appetite changes and agitation. I agree and understand that I and my prescriber have the following rights and responsibilities regarding my treatment plan:   1. MY RIGHTS:  To be informed of my treatment and medication plan. To be an active participant in my health and wellbeing. 2. MY RESPONSIBILITY AND UNDERSTANDING FOR USE OF MEDICATIONS   I will take medications at the dose and frequency as directed. For my safety, I will not increase or change how I take my medications without the recommendation of my healthcare provider.  I will actively participate in any program recommended by my provider which may improve function, including social, physical, psychological programs.  I will not take my medications with alcohol or other drugs not prescribed to me. I understand that drinking alcohol with my medications increases the chances of side effects, including reduced breathing rate and could lead to personal injury when operating machinery.  I understand that if I have a history of substance use disorders, including alcohol or other illicit drugs, that I may be at increased risk of addiction to my medications.  I agree to notify my provider immediately if I should become pregnant so that my treatment plan can be adjusted.    I agree and understand that I shall only receive controlled substance medications from the prescriber that signed this agreement unless there is written agreement among other prescribers of controlled substances outlining the responsibility of the medications being prescribed.  I understand that the if the controlled medication is not helping to achieve goals, the dosage may be tapered and no longer prescribed. 3. MY RESPONSIBILITY FOR COMMUNICATION / PRESCRIPTION RENEWALS   I agree that all controlled substance medications that I take will be prescribed only by my provider. If another healthcare provider prescribes me medication in an emergency, I will notify my provider within seventy-two (72) hours.  [facility] (2000)             Page 2 of 4    Initials:_______   I will arrange for refills at the prescribed interval ONLY during regular office hours. I will not ask for refills earlier than agreed, after-hours, on holidays or weekends. Refills may take up to 72 hours for processing and prescriptions to reach the pharmacy.  I will inform my other health care providers that I am taking these medications and of the existence of this [id number]. In the event of an emergency, I will provide the same information to the emergency department prescribers.  I will keep my provider updated on the pharmacy I am using for controlled medication prescription filling. 4. MY RESPONSIBILITY FOR PROTECTING MEDICATIONS   I will protect my prescriptions and medications. I understand that lost or misplaced prescriptions will not be replaced.  I will keep medications only for my own use and will not share them with others. I will keep all medications away from children.  I agree that if my medications are adjusted or discontinued, I will properly dispose of any remaining medications. I understand that I will be required to dispose of any remaining controlled medications as, directed by my prescriber, prior to being provided with any prescriptions for other controlled medications.   Medication drop box locations can be found at: [employer]  5. MY RESPONSIBILITY WITH ILLEGAL DRUGS    I will not use illegal or street drugs or another person's prescription medications not prescribed to me.  If there are identified addiction type symptoms, then referral to a program may be provided by my provider and I agree to follow through with this recommendation. 6. MY RESPONSIBILITY FOR COOPERATION WITH INVESTIGATIONS   I understand that my provider will comply with any applicable law and may discuss my use and/or possible misuse/abuse of controlled substances and alcohol, as appropriate, with any health care provider involved in my care, pharmacist, or legal authority.  I authorize my provider and pharmacy to cooperate fully with law enforcement agencies (as permitted by law) in the investigation of any possible misuse, sale, or other diversion of my controlled substances.  I agree to waive any applicable privilege or right of privacy or confidentiality with respect to these authorizations. 7. PROVIDERS RIGHT TO MONITOR FOR SAFETY: PRESCRIPTION MONITORING / DRUG TESTING   I consent to drug/toxicology screening and will submit to a drug screen upon my providers request to assure I am only taking the prescribed drugs for my safety monitoring. I understand that a drug screen is a laboratory test in which a sample of my urine, blood or saliva is checked to see what drugs I have been taking. This may entail an observed urine specimen, which means that a nurse or other health care provider may watch me provide urine, and I will cooperate if I am asked to provide an observed specimen. Naida Frost (2000)             Page 3 of 4    Initials:_______  Dony Arredondo I understand that my provider will check a copy of my State Prescription Monitoring Program () Report in order to safely prescribe medications.      Pill Counts: I consent to pill counts when requested. I may be asked to bring all my prescribed controlled substance medications, in their original bottles, to all of my scheduled appointments. In addition, my provider may ask me to come to the practice at any time for a random pill count. 8. TERMINATION OF THIS AGREEMENT   For my safety, my prescriber has the right to stop prescribing controlled substance medications and may end this agreement.  Conditions that may result in termination of this agreement:  a. I do not show any improvement in pain, or my activity has not improved. b. I develop rapid tolerance or loss of improvement, as described in my treatment plan.  c. I develop significant side effects from the medication. d. My behavior is not consistent with the responsibilities outlined above, thereby causing safety concerns to continue prescribing controlled substance medications. e. I fail to follow the terms of this agreement. f. Other:____________________________     UNDERSTANDING THIS MEDICATION AGREEMENT:    I have read the above and have had all my questions answered. For chronic disease management, I know that my symptoms can be managed with many types of treatments. A chronic medication trial may be part of my treatment, but I must be an active participant in my care. Medication therapy is only one part of my symptom management plan. In some cases, there may be limited scientific evidence to support the chronic use of certain medications to improve symptoms and daily function. Furthermore, in certain circumstances, there may be scientific information that suggests that the use of chronic controlled substances may worsen my symptoms and increase my risk of unintentional death directly related to this medication therapy. I know that if my provider feels my risk from controlled medications is greater than my benefit, I will have my controlled substance medication(s) compassionately lowered or removed altogether. I further agree to allow this office to contact my HIPAA contact if there are concerns about my safety and use of the controlled medications. I have agreed to use the prescribed controlled substance medications to me as instructed by my provider and as stated in this Medication Agreement. My initial on each page and my signature below shows that I have read each page and I have had the opportunity to ask questions with answers provided by my provider.       Patient Name (Printed): _____________________________________    Patient Signature:  ______________________   Date: _____________      Prescriber Name (Printed): ___________________________________    Prescriber Signature: _____________________  Date: _____________     William Bush (2000)             Page 4 of 4

## 2022-04-06 NOTE — PATIENT INSTRUCTIONS
· Atomoxetine  · Guanfacine    Finding a counselor:   · First start by calling your insurance company to see who is covered for psychiatric services. · Take the list they give you, and look the offices up online and see if providers are listed. Some sites have pictures and descriptions of specialties posted for the providers as well. (Ex Trauma, Grief, Addiction, etc)  · When reviewing the names of providers try to avoid scheduling with those with the license of DO or MD, they will likely do medication management only. · Be sure to mention you are looking for counseling when booking appointment    Escitalopram (By mouth)   Escitalopram (sn-zrg-NWW-oh-pram)  Treats depression and generalized anxiety disorder (RICH). Brand Name(s): Lexapro   There may be other brand names for this medicine. When This Medicine Should Not Be Used: This medicine is not right for everyone. Do not use it if you had an allergic reaction to escitalopram or citalopram.  How to Use This Medicine:   Liquid, Tablet  · Take this medicine as directed. You may need to take it for a month or more before you feel better. Your dose may need to be changed to find out what works best for you. · Measure the oral liquid medicine with a marked measuring spoon, oral syringe, or medicine cup. · This medicine should come with a Medication Guide. Ask your pharmacist for a copy if you do not have one. · Missed dose: Take a dose as soon as you remember. If it is almost time for your next dose, wait until then and take a regular dose. Do not take extra medicine to make up for a missed dose. · Store the medicine in a closed container at room temperature, away from heat, moisture, and direct light. Drugs and Foods to Avoid:   Ask your doctor or pharmacist before using any other medicine, including over-the-counter medicines, vitamins, and herbal products. · Do not use this medicine together with pimozide.  Do not use this medicine and an MAO inhibitor (MAOI) within 14 days of each other. · Some medicines can affect how escitalopram works. Tell your doctor if you are using the following:   ¨ Buspirone, carbamazepine, fentanyl, lithium, Shantelle's wort, tramadol, or tryptophan supplements  ¨ Amphetamines  ¨ Blood thinner (including warfarin)  ¨ Diuretic (water pill)  ¨ NSAID pain or arthritis medicine (including aspirin, celecoxib, diclofenac, ibuprofen, naproxen)  ¨ Triptan medicine to treat migraine headaches (including sumatriptan)  · Tell your doctor if you use anything else that makes you sleepy. Some examples are allergy medicine, narcotic pain medicine, and alcohol. · Do not drink alcohol while you are using this medicine. Warnings While Using This Medicine:   · Tell your doctor if you are pregnant or breastfeeding, or if you have kidney disease, liver disease, bleeding problems, glaucoma, heart disease, or a seizure disorder. · For some children, teenagers, and young adults, this medicine may increase mental or emotional problems. This may lead to thoughts of suicide and violence. Talk with your doctor right away if you have any thoughts or behavior changes that concern you. Tell your doctor if you or anyone in your family has a history of bipolar disorder or suicide attempts. · This medicine may cause the following problems:   ¨ Serotonin syndrome (more likely when taken with certain medicines)  ¨ Low sodium levels  ¨ Increased risk of bleeding problems  · This medicine may make you dizzy or drowsy. Do not drive or do anything that could be dangerous until you know how this medicine affects you. · Your doctor may want to monitor your child's weight and height, because this medicine may cause decreased appetite and weight loss in children. · Do not stop using this medicine suddenly. Your doctor will need to slowly decrease your dose before you stop it completely.   · Your doctor will check your progress and the effects of this medicine at regular visits. Keep all appointments. · Keep all medicine out of the reach of children. Never share your medicine with anyone. Possible Side Effects While Using This Medicine:   Call your doctor right away if you notice any of these side effects:  · Allergic reaction: Itching or hives, swelling in your face or hands, swelling or tingling in your mouth or throat, chest tightness, trouble breathing  · Anxiety, restlessness, fever, sweating, muscle spasms, nausea, vomiting, diarrhea, seeing or hearing things that are not there  · Confusion, weakness, and muscle twitching  · Eye pain, vision changes, seeing halos around lights  · Fast, pounding, or uneven heartbeat  · Feeling more excited or energetic than usual, racing thoughts, trouble sleeping  · Seizures  · Thoughts of hurting yourself or others, unusual behavior  · Unusual bleeding or bruising  If you notice these less serious side effects, talk with your doctor:   · Dizziness, drowsiness, or sleepiness  · Dry mouth  · Headache  · Nausea, constipation, diarrhea  · Sexual problems  If you notice other side effects that you think are caused by this medicine, tell your doctor. Call your doctor for medical advice about side effects. You may report side effects to FDA at 0-837-FDA-1728  © 2017 [facility] Information is for End User's use only and may not be sold, redistributed or otherwise used for commercial purposes. The above information is an  only. It is not intended as medical advice for individual conditions or treatments. Talk to your doctor, nurse or pharmacist before following any medical regimen to see if it is safe and effective for you. Many people with depression and anxiety can find relief with adding some of the following methods to their treatment plan. These methods have all been scientifically proven to help ease symptoms.  Here's what the research recommends:    EXERCISE  One of the most-studied natural approaches to treating depression, regular physical activity may lift mood in part by increasing certain neurotransmitters. Of course, embracing an exercise habit isn't easy for most people--especially those with depression. \"In my experience, the last thing depressed people want to do is move,\" says Dr. Edvin Chinchilla,  of the [facility]. \"But it has a striking effect. \"  Plus, it's free. COGNITIVE-BEHAVIORAL THERAPY  CBT, a type of talk therapy, focuses on changing negative thought patterns, and then learning how to home in on specific problems and find new ways to approach them. It typically lasts for 10 to 20 sessions. Some studies have shown it to be as effective as medication. [address] [place] [address] with depression often withdraw from the world, and this therapy seeks to bring them back in. Treatment involves helping people identify activities that add meaning to their life, like reading, volunteering or hanging out with friends, and encourages them to do these things without waiting for their mood to lift first. In a recent study published in the Lancet, this kind of therapy was shown to be as effective as CBT. And it costs much less, because practitioners don't need as much training. MINDFULNESS TRAINING  \"Thoughts and images are often the source of sadness and fear, and if you have no training in getting your attention away from them, you're helpless,\" says Jason Campos. One such program, an eight-week small-group treatment called mindfulness-based cognitive therapy, trains people to be aware of the present moment through mindfulness practices like gentle yoga and daily meditation. It was shown in a 2016 study in [address] [place] [address] Psychiatry to help people with recurrent depression avoid relapses even better than antidepressants.     Cognitive Behavioral Skills You'll Need to Beat Anxiety    Five essential skills for overcoming anxiety and getting on with a happy life.    1. The ability to tolerate uncertainty. Studies have shown that intolerance of uncertainty is a key factor in anxiety and depression. People who can't tolerate uncertainty often avoid situations, procrastinate, seek reassurance constantly, delay taking action, do excessive checking, and refuse to delegate. 2. The ability to recognize rumination. Rumination is when you're repeatedly bothered by a worry thought. When people ruminate, their problem solving capacity is reduced. If you're ruminating, it's often best to wait to attempt to problem solve until you can think about the issue without jumping straight into rumination mode. If you can learn to recognize when you're ruminating, you can use cognitive behavioral therapy techniques, defusion techniques, or mindfulness techniques to help you stop ruminating. The best thing you can do when you're ruminating is accept that you're having whatever thoughts you're having, recognize that the thoughts might not be accurate, and allow the thoughts to pass in their own time rather than trying to block them out. Trying to block out distressing thoughts will just cause increased intensity and intrusions of the thoughts you're trying not to have. 3. The ability to recognize thought distortions. Types of thought distortions include: making excessively negative predictions, underestimating your ability to cope, personalizing, mind reading, catastrophizing, \"shoulds\" and \"musts,\" making judgments of yourself or others that are black and white rather than gray, entitlement thoughts (e.g., thinking that the normal rules shouldn't apply to you), and more. The key is recognizing thought distortions is to ask yourself what thoughts you're having when you feel distressed. Some of these thoughts are likely to be thought distortions. 4. The ability and willingness to use mindfulness techniques.     Mindfulness techniques can help reduce anxiety and increase willpower. Practicing mindfulness will help you stop avoidance coping, make better choices even when you're feeling anxious, and help you ruminate less. Try this 10-minute mindful walking exercise to get started. 5. The ability to talk to yourself kindly about your imperfections and mistakes. Criticizing yourself harshly when you make a mistake or when one of your personal imperfections shows up is likely to lead to rumination and avoidance coping. Research has shown that talking to yourself kindly not only helps you feel betterit also increases your motivation for self-improvement.

## 2022-04-06 NOTE — PROGRESS NOTES
Derrick Treviño is a 24 y.o. female (: 2000) presenting to address:    Chief Complaint   Patient presents with    Transfer Of Care    Medication Evaluation       Vitals:    22 1352   BP: 97/64   Pulse: 83   Resp: 16   Temp: 98.6 °F (37 °C)   TempSrc: Temporal   SpO2: 100%   Weight: 133 lb 3.2 oz (60.4 kg)   Height: 5' 3\" (1.6 m)   PainSc:   0 - No pain   LMP: 03/15/2022       Hearing/Vision:   No exam data present    Learning Assessment:     Learning Assessment 4/10/2015   PRIMARY LEARNER Patient   BARRIERS PRIMARY LEARNER NONE   CO-LEARNER CAREGIVER Yes   CO-LEARNER NAME Mother   CO-LEARNER HIGHEST LEVEL OF EDUCATION 4 YEARS OF COLLEGE   BARRIERS CO-LEARNER NONE   PRIMARY LANGUAGE ENGLISH   PRIMARY LANGUAGE CO-LEARNER ENGLISH    NEED No   LEARNER PREFERENCE PRIMARY LISTENING   LEARNER PREFERENCE CO-LEARNER LISTENING   LEARNING SPECIAL TOPICS no   ANSWERED BY self   RELATIONSHIP SELF     Depression Screening:     3 most recent PHQ Screens 2022   Little interest or pleasure in doing things Not at all   Feeling down, depressed, irritable, or hopeless Not at all   Total Score PHQ 2 0   In the past year have you felt depressed or sad most days, even if you felt okay? -   Has there been a time in the past month when you have had serious thoughts about ending your life? -   Have you ever in your whole life, tried to kill yourself or made a suicide attempt? -     Fall Risk Assessment:     Fall Risk Assessment, last 12 mths 2022   Able to walk? Yes   Fall in past 12 months? 0   Do you feel unsteady? 0   Are you worried about falling 0     Abuse Screening:     Abuse Screening Questionnaire 2022   Do you ever feel afraid of your partner? N   Are you in a relationship with someone who physically or mentally threatens you? N   Is it safe for you to go home? Y     ADL Assessment:   No flowsheet data found. Coordination of Care Questionaire:   1.  \"Have you been to the ER, urgent care clinic since your last visit? Hospitalized since your last visit? \" No    2. \"Have you seen or consulted any other health care providers outside of the [address] [place] [address] since your last visit? \" No     3. For patients aged 39-70: Has the patient had a colonoscopy / FIT/ Cologuard? NA - based on age      If the patient is female:    4. For patients aged 41-77: Has the patient had a mammogram within the past 2 years? NA - based on age or sex  See top three    5. For patients aged 21-65: Has the patient had a pap smear? No    Advanced Directive:   1. Do you have an Advanced Directive? NO    2. Would you like information on Advanced Directives?  NO

## 2022-04-07 ENCOUNTER — TELEPHONE (OUTPATIENT)
Dept: FAMILY MEDICINE CLINIC | Age: 22
End: 2022-04-07

## 2022-04-07 DIAGNOSIS — F90.2 ADHD (ATTENTION DEFICIT HYPERACTIVITY DISORDER), COMBINED TYPE: Primary | ICD-10-CM

## 2022-04-07 NOTE — TELEPHONE ENCOUNTER
Received fax from pharmacy stating that methylphenidate HCL 15 mg SC40 needs prior auth. Filled out form and attached Psychological Evaluation report and faxed.  Waiting on approval/ denial.

## 2022-04-11 RX ORDER — METHYLPHENIDATE HYDROCHLORIDE 18 MG/1
18 TABLET ORAL DAILY
Qty: 30 TABLET | Refills: 0 | Status: SHIPPED | OUTPATIENT
Start: 2022-04-11 | End: 2022-04-14 | Stop reason: ALTCHOICE

## 2022-04-11 NOTE — TELEPHONE ENCOUNTER
Received denial stating that the medication requested is not covered under the pharmacy benefits of plan.     After pulling the formulary found the following:       Aptensio XR      The plan prefers the following:    Methylphenidate Hydrochloride ER      Concerta

## 2022-04-13 LAB — REPORT SUMMARY: NORMAL

## 2022-04-14 DIAGNOSIS — F90.2 ADHD (ATTENTION DEFICIT HYPERACTIVITY DISORDER), COMBINED TYPE: Primary | ICD-10-CM

## 2022-04-14 RX ORDER — METHYLPHENIDATE HYDROCHLORIDE 20 MG/1
20 CAPSULE, EXTENDED RELEASE ORAL DAILY
Qty: 30 CAPSULE | Refills: 0 | Status: SHIPPED | OUTPATIENT
Start: 2022-06-13 | End: 2022-05-09 | Stop reason: SDUPTHER

## 2022-04-18 ENCOUNTER — TELEPHONE (OUTPATIENT)
Dept: FAMILY MEDICINE CLINIC | Age: 22
End: 2022-04-18

## 2022-04-18 NOTE — TELEPHONE ENCOUNTER
Received prior auth from [facility], stated auth and sent to plan    Jono Aguiar (Chinchilla: [id number]) - [id number]  Methylphenidate HCl ER TBCR 18MG tablets       Status: Sent to Plan    Created: April 13th, 2022 [phone]    Sent: April 18th, 2022

## 2022-04-19 NOTE — TELEPHONE ENCOUNTER
Faxed over last office note along with evaluation and testing from Dr. Suzan Gee w/ neuropsych. Approval/denial pending.

## 2022-05-09 ENCOUNTER — VIRTUAL VISIT (OUTPATIENT)
Dept: FAMILY MEDICINE CLINIC | Age: 22
End: 2022-05-09
Payer: COMMERCIAL

## 2022-05-09 DIAGNOSIS — F90.2 ADHD (ATTENTION DEFICIT HYPERACTIVITY DISORDER), COMBINED TYPE: ICD-10-CM

## 2022-05-09 DIAGNOSIS — Z51.81 MEDICATION MONITORING ENCOUNTER: ICD-10-CM

## 2022-05-09 DIAGNOSIS — F41.9 ANXIETY AND DEPRESSION: Primary | ICD-10-CM

## 2022-05-09 DIAGNOSIS — F32.A ANXIETY AND DEPRESSION: Primary | ICD-10-CM

## 2022-05-09 PROCEDURE — 99214 OFFICE O/P EST MOD 30 MIN: CPT | Performed by: STUDENT IN AN ORGANIZED HEALTH CARE EDUCATION/TRAINING PROGRAM

## 2022-05-09 RX ORDER — METHYLPHENIDATE HYDROCHLORIDE 20 MG/1
20 CAPSULE, EXTENDED RELEASE ORAL DAILY
Qty: 30 CAPSULE | Refills: 0 | Status: SHIPPED | OUTPATIENT
Start: 2022-05-19 | End: 2022-05-18 | Stop reason: DRUGHIGH

## 2022-05-09 RX ORDER — ESCITALOPRAM OXALATE 10 MG/1
10 TABLET ORAL DAILY
Qty: 90 TABLET | Refills: 1 | Status: SHIPPED | OUTPATIENT
Start: 2022-05-09

## 2022-05-09 RX ORDER — ATOMOXETINE 80 MG/1
80 CAPSULE ORAL DAILY
Qty: 90 CAPSULE | Refills: 1 | Status: SHIPPED | OUTPATIENT
Start: 2022-05-09

## 2022-05-09 NOTE — PATIENT INSTRUCTIONS
Day-to-day coping with ADHD  · Use a detailed daily planner, notebooks, reminder notes, or your smart phone. Clement Fly Keeping everything in one place makes it easier to find the reminders you may need. You might want to keep track of appointments and schedules, to do lists, important dates, websites, phone numbers and addresses, meeting notes, and even movies youd like to see or books youd like to read. · Do the most demanding tasks at the time of they day when you feel your energy levels are the highest.  · Exercise your brain. Take a class, do word puzzles, or learn a new language. · Get enough rest and sleep. · Keep moving. Regular physical activity is not only good for your body, but also improves your mood, makes you feel more alert, and decreases tiredness (fatigue). · Eat veggies. Studies have shown that eating more vegetables is linked to keeping brain power as people age. · Set up and follow routines. Try to keep the same daily schedule. · Pick a certain place for commonly lost objects (like keys) and put them there each time. · Try not to multi-task. Focus on one thing at a time. · Avoid alcohol and other agents that might change your mental state and sleeping patterns  · Ask for help when you need it. Friends and loved ones can help with daily tasks to cut down on distractions and help you save mental energy. · Track your memory problems. Keep a diary of when you notice problems and whats going on at the time. Medicines taken, time of day, and the situation youre in might help you figure out what affects your memory. Keeping track of when the problems are most noticeable can also help you prepare. Brittany Morrell know to avoid planning important conversations or appointments during those times. This record will also be useful when you talk with your doctor about these problems.

## 2022-05-09 NOTE — PROGRESS NOTES
Sharad Laguna (: 2000) is a 24 y.o. female, established patient, here for:    ASSESSMENT/PLAN:    ICD-10-CM ICD-9-CM   1. Anxiety and depression  F41.9 300.00    F32. A 311   2. ADHD (attention deficit hyperactivity disorder), combined type  F90.2 314.01   3. Medication monitoring encounter  Z51.81 V58.83     #ADHD  With #Anxiety and Depression - ongoing, some improvement in Anxiety with recent adjustments  Diagnosed in elementary school and has been on stimulant treatment since with good effect on symptoms. Denies notable AE. Established with Neuropsychiatry, rec review last visit 9/10/21  CSA reviewed and signed in office 22, [employer] 22  Taking daily because doesn't tolerate well when restarting medication after break. Initially with 40mg of nonstimulant treatment for ADHD, no improvement in symptoms noted but after increasing to 80mg daily has found effective control of ADHD symptoms with concurrent use of decreased Methylphenidate dosing (there was insurance coverage difficulty so this med was delayed)     2022   1  Methylphenidate Er 18 Mg Tab   30.00  30  Ki Neg  [id number]  Wal (4012)  0   Comm Ins  [place]     2022   1  Methylphenidate Er 36 Mg Tab   90.00  90  Jahaira Jagdeep  [id number]           Last PDMP Narvis Neat as Reviewed:  Review User Review Instant Review Result   Margie Lala 2022  6:42 AM Reviewed PDMP [1]     Current regimen and adjustments:   · On Lexapro 10mg ~1mo without notable improvement in mood however has multiple life stressors ongoing, upcoming graduation, new job, and agreeable to maintain dosing for 1 more month before adjusting again. · Reduced Methylphenidate from 36 to 18mg and tolerating well   · On Atomoxetine 80mg   · Counseled on nonpharmacologic interventions that could improve outcomes as well including exercise, therapy/counseling, and self care.      Orders Placed This Encounter    atomoxetine (STRATTERA) 80 mg capsule     Sig: Take 1 Capsule by mouth daily. Indications: attention deficit disorder with hyperactivity     Dispense:  90 Capsule     Refill:  1    escitalopram oxalate (LEXAPRO) 10 mg tablet     Sig: Take 1 Tablet by mouth daily. Dispense:  90 Tablet     Refill:  1    methylphenidate HCl 20 mg SC40     Sig: Take 20 mg by mouth daily for 30 days. Max Daily Amount: 20 mg. Indications: attention deficit disorder with hyperactivity     Dispense:  30 Capsule     Refill:  0       Return in about 3 months (around 8/9/2022) for medication monitoring (or sooner if need to adjust medications). SUBJECTIVE/OBJECTIVE:  Last PCP visit: 4/6/2022    Since last visit:   Any changes in health, procedures, hospital visits, or specialist visits? Denies  Tolerating medications without adverse reactions? Reports good compliance with Rx without notable adverse effects. Current Outpatient Medications   Medication Sig    atomoxetine (STRATTERA) 80 mg capsule Take 1 Capsule by mouth daily. Indications: attention deficit disorder with hyperactivity    escitalopram oxalate (LEXAPRO) 10 mg tablet Take 1 Tablet by mouth daily.  [START ON 5/19/2022] methylphenidate HCl 20 mg SC40 Take 20 mg by mouth daily for 30 days. Max Daily Amount: 20 mg. Indications: attention deficit disorder with hyperactivity    Cholecalciferol, Vitamin D3, (VITAMIN D3) 1,000 unit cap Take 1 Tab by mouth daily. No current facility-administered medications for this visit. There were no vitals taken for this visit. Physical Exam  Nursing note reviewed. Constitutional:       General: She is not in acute distress. Pulmonary:      Effort: Pulmonary effort is normal. No respiratory distress. Neurological:      Mental Status: She is alert and oriented to person, place, and time. Psychiatric:         Mood and Affect: Mood normal.         Behavior: Behavior normal.         Thought Content:  Thought content normal.         Judgment: Judgment normal. Skye Sharp, who was evaluated through a synchronous (real-time) audio-video encounter, and/or her healthcare decision maker, is aware that it is a billable service, which includes applicable co-pays, with coverage as determined by her insurance carrier. She provided verbal consent to proceed and patient identification was verified. This visit was conducted pursuant to the emergency declaration under the [address] [place] [address] and the [email or web address] and [employer] Act. A caregiver was present when appropriate. Ability to conduct physical exam was limited. The patient was located at home in a state where the provider was licensed to provide care.      Marlo Cantu DO  Family Medicine  05/09/2022

## 2022-05-12 ENCOUNTER — PATIENT MESSAGE (OUTPATIENT)
Dept: FAMILY MEDICINE CLINIC | Age: 22
End: 2022-05-12

## 2022-05-12 DIAGNOSIS — F90.2 ADHD (ATTENTION DEFICIT HYPERACTIVITY DISORDER), COMBINED TYPE: Primary | ICD-10-CM

## 2022-05-12 NOTE — TELEPHONE ENCOUNTER
From: Naida Frost  To: Josefina Mera DO  Sent: 5/12/2022 4:46 PM EDT  Subject: Concerta    Hi Dr. Екатерина Schwarz,    Im really sorry I forgot to say this when we had the virtual visit, I didnt know it at the time, but I do need a refill on my concerta. Im hoping I can pick it up before I lose my secondary insurance on May 20th. I only have about a week and a day left of my concerta.      Naida Frost

## 2022-05-18 RX ORDER — METHYLPHENIDATE HYDROCHLORIDE 18 MG/1
18 TABLET ORAL DAILY
COMMUNITY
Start: 2022-04-19 | End: 2022-05-18 | Stop reason: SDUPTHER

## 2022-05-18 RX ORDER — METHYLPHENIDATE HYDROCHLORIDE 18 MG/1
18 TABLET ORAL DAILY
Qty: 30 TABLET | Refills: 0 | Status: SHIPPED | OUTPATIENT
Start: 2022-05-18

## 2022-05-18 NOTE — TELEPHONE ENCOUNTER
Rx for methylphenidate 20  Mg was sent to pharmacy instead of 18 which she is taking please resend. 20 mg canceled already.

## 2022-12-03 DIAGNOSIS — Z51.81 MEDICATION MONITORING ENCOUNTER: ICD-10-CM

## 2022-12-03 DIAGNOSIS — F90.2 ADHD (ATTENTION DEFICIT HYPERACTIVITY DISORDER), COMBINED TYPE: ICD-10-CM

## 2022-12-05 RX ORDER — ATOMOXETINE 80 MG/1
CAPSULE ORAL
Qty: 90 CAPSULE | Refills: 1 | Status: SHIPPED | OUTPATIENT
Start: 2022-12-05

## 2022-12-19 NOTE — PROGRESS NOTES
Steffen Pérez (: 2000) is a 20 y.o. female, ESTABLISHED patient, here for FOLLOW UP:    ICD-10-CM ICD-9-CM   1. Needs flu shot  Z23 V04.81   2. ADHD (attention deficit hyperactivity disorder), combined type  F90.2 314.01   3. Medication monitoring encounter  Z51.81 V58.83   4. Mild episode of recurrent major depressive disorder (HCC)  F33.0 296.31   5. Anxiety  F41.9 300.00     Assessment   Plan   #Flu shot needed  Flu shot administered in office without adverse reaction    #ADHD  With #Anxiety and Depression - ongoing, some improvement in Anxiety with recent adjustments  Diagnosed in elementary school and has been on stimulant treatment since with good effect on symptoms. Denies notable AE. Established with Neuropsychiatry, rec review last visit 9/10/21  CSA reviewed and signed in office 22, [employer]ox[employer] 22  Taking daily because doesn't tolerate well when restarting medication after break. Initially with 40mg of nonstimulant treatment for ADHD, no improvement in symptoms noted but after increasing to 80mg daily has found effective control of ADHD symptoms with concurrent use of decreased Methylphenidate dosing (there was insurance coverage difficulty so this med was delayed)      2022   1  Methylphenidate Er 18 Mg Tab 30.00  30  Ki Neg  [id number]   [facility] (4012)  [place]     20[place]   1  Methylphenidate Er 18 Mg Tab 30.00  30  Ki Neg          Last PDMP Matthew as Reviewed:  Review User Review Instant Review Result   Katt TINAJERO 2022  1:32 PM Reviewed PDMP [1]      Current regimen and adjustments: Tolerating medications without notable AE, will continue regimen unchanged  Reduced Methylphenidate from 36 to 10mg BID and tolerating well   Tolerating Atomoxetine 80mg   Counseled on nonpharmacologic interventions that could improve outcomes as well including exercise, therapy/counseling, and self care.              Orders Placed This Encounter    Influenza, FLUARIX, FLULAVAL, Fluzone (age 10 mo+), AFLURIA (age 3y+) IM, PF, 0.5 mL    escitalopram oxalate (LEXAPRO) 20 mg tablet     Sig: Take 1 Tablet by mouth daily. Indications: repeated episodes of anxiety     Dispense:  90 Tablet     Refill:  4    DISCONTD: methylphenidate HCl (RITALIN) 10 mg tablet     Sig: Take 1 Tablet by mouth two (2) times a day. Max Daily Amount: 20 mg. Indications: attention deficit disorder with hyperactivity     Dispense:  60 Tablet     Refill:  0     New stimulant rx    methylphenidate HCl (RITALIN) 10 mg tablet     Sig: Take 1 Tablet by mouth two (2) times a day. Max Daily Amount: 20 mg. Indications: attention deficit disorder with hyperactivity     Dispense:  60 Tablet     Refill:  0     [facility] rx    methylphenidate HCl (RITALIN) 10 mg tablet     Sig: Take 1 Tablet by mouth two (2) times a day. Max Daily Amount: 20 mg. Indications: attention deficit disorder with hyperactivity     Dispense:  60 Tablet     Refill:  0     Feb rx     Return in about 3 months (around 3/22/2023) for 30 min follow up, Controlled Med Monitoring, Virtual OK. Subjective   Last PCP visit: 5/9/2022  See A/P     Current Outpatient Medications   Medication Instructions    atomoxetine (STRATTERA) 80 mg capsule TAKE 1 CAPSULE BY MOUTH DAILY FOR ADHD    cholecalciferol (VITAMIN D3) 25 mcg (1,000 unit) cap 1 Tablet, Oral, DAILY    escitalopram oxalate (LEXAPRO) 20 mg, Oral, DAILY    methylphenidate HCl (RITALIN) 10 mg, Oral, 2 TIMES DAILY    [START ON 2/20/2023] methylphenidate HCl (RITALIN) 10 mg, Oral, 2 TIMES DAILY    methylphenidate HCl (RITALIN) 10 mg, Oral, 2 TIMES DAILY      Objective   Visit Vitals  BP 95/66 (BP 1 Location: Right arm, BP Patient Position: Sitting, BP Cuff Size: Adult)   Pulse 88   Temp 98 °F (36.7 °C) (Temporal)   Resp 16   Ht 5' 3\" (1.6 m)   Wt 139 lb (63 kg)   LMP 12/11/2022 (Exact Date)   SpO2 100%   BMI 24.62 kg/m²     Physical Exam  Vitals and nursing note reviewed.    Constitutional: General: She is not in acute distress. Cardiovascular:      Rate and Rhythm: Normal rate. Pulses: Normal pulses. Pulmonary:      Effort: Pulmonary effort is normal. No respiratory distress. Neurological:      Mental Status: She is alert and oriented to person, place, and time.       Gait: Gait normal.   Psychiatric:         Mood and Affect: Mood normal.         Behavior: Behavior normal.          Kemal Arceo DO  Family Medicine  12/22/2022

## 2022-12-22 ENCOUNTER — OFFICE VISIT (OUTPATIENT)
Dept: FAMILY MEDICINE CLINIC | Age: 22
End: 2022-12-22
Payer: COMMERCIAL

## 2022-12-22 VITALS
DIASTOLIC BLOOD PRESSURE: 66 MMHG | SYSTOLIC BLOOD PRESSURE: 95 MMHG | HEART RATE: 88 BPM | TEMPERATURE: 98 F | OXYGEN SATURATION: 100 % | RESPIRATION RATE: 16 BRPM | HEIGHT: 63 IN | WEIGHT: 139 LBS | BODY MASS INDEX: 24.63 KG/M2

## 2022-12-22 DIAGNOSIS — F33.0 MILD EPISODE OF RECURRENT MAJOR DEPRESSIVE DISORDER (HCC): ICD-10-CM

## 2022-12-22 DIAGNOSIS — F41.9 ANXIETY: ICD-10-CM

## 2022-12-22 DIAGNOSIS — Z23 NEEDS FLU SHOT: Primary | ICD-10-CM

## 2022-12-22 DIAGNOSIS — Z51.81 MEDICATION MONITORING ENCOUNTER: ICD-10-CM

## 2022-12-22 DIAGNOSIS — F90.2 ADHD (ATTENTION DEFICIT HYPERACTIVITY DISORDER), COMBINED TYPE: ICD-10-CM

## 2022-12-22 RX ORDER — METHYLPHENIDATE HYDROCHLORIDE 10 MG/1
10 TABLET ORAL 2 TIMES DAILY
Qty: 60 TABLET | Refills: 0 | Status: SHIPPED | OUTPATIENT
Start: 2023-01-21

## 2022-12-22 RX ORDER — ESCITALOPRAM OXALATE 20 MG/1
20 TABLET ORAL DAILY
Qty: 90 TABLET | Refills: 4 | Status: SHIPPED | OUTPATIENT
Start: 2022-12-22

## 2022-12-22 RX ORDER — METHYLPHENIDATE HYDROCHLORIDE 10 MG/1
10 TABLET ORAL 2 TIMES DAILY
Qty: 60 TABLET | Refills: 0 | Status: SHIPPED | OUTPATIENT
Start: 2022-12-22 | End: 2022-12-30 | Stop reason: SDUPTHER

## 2022-12-22 RX ORDER — METHYLPHENIDATE HYDROCHLORIDE 10 MG/1
10 TABLET ORAL 2 TIMES DAILY
Qty: 60 TABLET | Refills: 0 | Status: SHIPPED | OUTPATIENT
Start: 2023-02-20

## 2022-12-22 NOTE — PROGRESS NOTES
Dmitri Rodney is a 20 y.o. female (: 2000) presenting to address:    Chief Complaint   Patient presents with    Medication Refill       Vitals:    22 0903   BP: 95/66   Pulse: 88   Resp: 16   Temp: 98 °F (36.7 °C)   TempSrc: Temporal   SpO2: 100%   Weight: 139 lb (63 kg)   Height: 5' 3\" (1.6 m)   PainSc:   0 - No pain   LMP: 2022       Hearing/Vision:   No results found. Learning Assessment:     Learning Assessment 4/10/2015   PRIMARY LEARNER Patient   BARRIERS PRIMARY LEARNER NONE   CO-LEARNER CAREGIVER Yes   CO-LEARNER NAME Mother   CO-LEARNER HIGHEST LEVEL OF EDUCATION 4 YEARS OF COLLEGE   BARRIERS CO-LEARNER NONE   PRIMARY LANGUAGE ENGLISH   PRIMARY LANGUAGE CO-LEARNER ENGLISH    NEED No   LEARNER PREFERENCE PRIMARY LISTENING   LEARNER PREFERENCE CO-LEARNER LISTENING   LEARNING SPECIAL TOPICS no   ANSWERED BY self   RELATIONSHIP SELF     Depression Screening:     3 most recent PHQ Screens 2022   Little interest or pleasure in doing things Not at all   Feeling down, depressed, irritable, or hopeless Not at all   Total Score PHQ 2 0   In the past year have you felt depressed or sad most days, even if you felt okay? -   Has there been a time in the past month when you have had serious thoughts about ending your life? -   Have you ever in your whole life, tried to kill yourself or made a suicide attempt? -     Fall Risk Assessment:     Fall Risk Assessment, last 12 mths 2022   Able to walk? Yes   Fall in past 12 months? 0   Do you feel unsteady? 0   Are you worried about falling 0     Abuse Screening:     Abuse Screening Questionnaire 2022   Do you ever feel afraid of your partner? N   Are you in a relationship with someone who physically or mentally threatens you? N   Is it safe for you to go home? Y     ADL Assessment:   No flowsheet data found. Coordination of Care Questionaire:   1. \"Have you been to the ER, urgent care clinic since your last visit? Hospitalized since your last visit? \" No    2. \"Have you seen or consulted any other health care providers outside of the [address] [place] [address] since your last visit? \" No     3. For patients aged 39-70: Has the patient had a colonoscopy / FIT/ Cologuard? NA - based on age      If the patient is female:    4. For patients aged 41-77: Has the patient had a mammogram within the past 2 years? NA - based on age or sex  See top three    5. For patients aged 21-65: Has the patient had a pap smear? No    Advanced Directive:   1. Do you have an Advanced Directive? NO    2. Would you like information on Advanced Directives?  NO

## 2022-12-22 NOTE — PATIENT INSTRUCTIONS
At each medication monitoring visit I will send your refill for the next 3 months to be available when needed. The only barrier in picking up on those dates would be if there is an insurance restriction or pharmacy issue in filling the script that is delaying things. At each visit I will also give you the AVS with the confirmation that I sent all 3 Rx's and it also lists the date of when you can contact the pharmacy to refill the medication next. Because it is a controlled substance it will never be automatically be filled by a pharmacy due to legal restrictions so you will always have to call your pharmacy on the date that the Rx is available to request it to be filled.

## 2022-12-30 DIAGNOSIS — F90.2 ADHD (ATTENTION DEFICIT HYPERACTIVITY DISORDER), COMBINED TYPE: ICD-10-CM

## 2022-12-30 RX ORDER — METHYLPHENIDATE HYDROCHLORIDE 10 MG/1
10 TABLET ORAL 2 TIMES DAILY
Qty: 60 TABLET | Refills: 0 | Status: SHIPPED | OUTPATIENT
Start: 2022-12-30

## 2022-12-30 NOTE — TELEPHONE ENCOUNTER
05/18/2022 05/18/2022   1  Methylphenidate Er 18 Mg Tab 30.00  30  Ki Neg  [id number]   [facility] ([id number])  0   [place]     04/19/2022 04/11/2022   1  Methylphenidate Er 18 Mg Tab 30.00  30  Ki Neg            Last PDMP Matthew as Reviewed:  Review User Review Instant Review Result   Lev Seymour 12/30/2022  1:32 PM Reviewed PDMP [1]     Last visit: 12/22/2022    Orders Placed This Encounter    methylphenidate HCl (RITALIN) 10 mg tablet     Sig: Take 1 Tablet by mouth two (2) times a day. Max Daily Amount: 20 mg.  Indications: attention deficit disorder with hyperactivity     Dispense:  60 Tablet     Refill:  0     New stimulant rx

## 2023-01-03 ENCOUNTER — TELEPHONE (OUTPATIENT)
Dept: FAMILY MEDICINE CLINIC | Age: 23
End: 2023-01-03

## 2023-02-20 ENCOUNTER — PATIENT MESSAGE (OUTPATIENT)
Dept: FAMILY MEDICINE CLINIC | Facility: CLINIC | Age: 23
End: 2023-02-20

## 2023-02-20 DIAGNOSIS — F90.2 ATTENTION-DEFICIT HYPERACTIVITY DISORDER, COMBINED TYPE: Primary | ICD-10-CM

## 2023-02-21 NOTE — TELEPHONE ENCOUNTER
From: Jaime Burks  Sent: 2/21/2023 2:55 PM EST  To: [facility] Clinical Staff  Subject: Ritalin    I ran out of Ritalin, I need more before March I only had a months supply.

## 2023-02-22 RX ORDER — METHYLPHENIDATE HYDROCHLORIDE 10 MG/1
10 TABLET ORAL 2 TIMES DAILY
Qty: 60 TABLET | Refills: 0 | Status: SHIPPED | OUTPATIENT
Start: 2023-02-22 | End: 2023-03-24

## 2023-02-22 NOTE — TELEPHONE ENCOUNTER
12/31/2022 12/30/2022   1  Methylphenidate 10 Mg Tablet 60.00  30  Ki Neg  [id number]   [facility] ([id number])  0   [place]     05/18/2022 05/18/2022   1  Methylphenidate Er 18 Mg Tab 30.00  30  Ki Neg

## 2023-03-28 NOTE — PROGRESS NOTES
Aleksandar Echevarria (: 2000) is a 20 y.o. female, ESTABLISHED patient, here for a VIRTUAL VISIT to address:    ICD-10-CM    1. Medication monitoring encounter  Z51.81 ToxAssure Select 13     methylphenidate (RITALIN) 10 MG tablet     methylphenidate (RITALIN) 10 MG tablet     methylphenidate (RITALIN) 10 MG tablet      2. Attention-deficit hyperactivity disorder, combined type  F90.2 Tox[employer]ure Select 13     methylphenidate (RITALIN) 10 MG tablet     methylphenidate (RITALIN) 10 MG tablet     methylphenidate (RITALIN) 10 MG tablet        Assessment   Plan     #ADHD  With #Anxiety and Depression - ongoing, some improvement in Anxiety with recent adjustments  Diagnosed in elementary school and has been on stimulant treatment since with good effect on symptoms. Denies notable AE. Established with Neuropsychiatry, rec review last visit 9/10/21  CSA reviewed and signed in office 22, Toxassure 22 (Plan to repeat when in town)  Initially with 40mg of nonstimulant treatment for ADHD, no improvement in symptoms noted but after increasing to 80mg daily has found effective control of ADHD symptoms with concurrent use of decreased Methylphenidate dosing (there was insurance coverage difficulty so this med was delayed)     2023   1  Methylphenidate 10 Mg Tablet 60.00  30  Ki Neg  [id number]   [facility] (4012)  0   [place]     2022   1  Methylphenidate 10 Mg Tablet 60.00  30  Ki Neg          Last PDMP Abdiaziz as Reviewed:  Review User Review Instant Review Result   Silvia Silva 3/28/2023  6:56 AM Reviewed PDMP [1]     Current regimen and adjustments:    Tolerating medications without notable AE, will continue regimen unchanged  Reduced Methylphenidate from 36 to 10mg BID and tolerating very well with improved control over symptoms and effect not extending into free time  Tolerating Atomoxetine 80mg   Counseled on nonpharmacologic interventions that could improve outcomes as well including

## 2023-03-29 ENCOUNTER — TELEMEDICINE (OUTPATIENT)
Dept: FAMILY MEDICINE CLINIC | Facility: CLINIC | Age: 23
End: 2023-03-29
Payer: COMMERCIAL

## 2023-03-29 DIAGNOSIS — F90.2 ATTENTION-DEFICIT HYPERACTIVITY DISORDER, COMBINED TYPE: ICD-10-CM

## 2023-03-29 DIAGNOSIS — Z51.81 MEDICATION MONITORING ENCOUNTER: Primary | ICD-10-CM

## 2023-03-29 PROCEDURE — 99213 OFFICE O/P EST LOW 20 MIN: CPT | Performed by: STUDENT IN AN ORGANIZED HEALTH CARE EDUCATION/TRAINING PROGRAM

## 2023-03-29 RX ORDER — METHYLPHENIDATE HYDROCHLORIDE 10 MG/1
10 TABLET ORAL 2 TIMES DAILY
Qty: 60 TABLET | Refills: 0 | Status: SHIPPED | OUTPATIENT
Start: 2023-04-29 | End: 2023-05-29

## 2023-03-29 RX ORDER — METHYLPHENIDATE HYDROCHLORIDE 10 MG/1
10 TABLET ORAL 2 TIMES DAILY
Qty: 60 TABLET | Refills: 0 | Status: SHIPPED | OUTPATIENT
Start: 2023-05-29 | End: 2023-06-28

## 2023-03-29 RX ORDER — METHYLPHENIDATE HYDROCHLORIDE 10 MG/1
10 TABLET ORAL 2 TIMES DAILY
Qty: 60 TABLET | Refills: 0 | Status: SHIPPED | OUTPATIENT
Start: 2023-03-29 | End: 2023-04-28

## 2023-03-29 SDOH — ECONOMIC STABILITY: HOUSING INSECURITY
IN THE LAST 12 MONTHS, WAS THERE A TIME WHEN YOU DID NOT HAVE A STEADY PLACE TO SLEEP OR SLEPT IN A SHELTER (INCLUDING NOW)?: NO

## 2023-03-29 SDOH — ECONOMIC STABILITY: INCOME INSECURITY: HOW HARD IS IT FOR YOU TO PAY FOR THE VERY BASICS LIKE FOOD, HOUSING, MEDICAL CARE, AND HEATING?: NOT HARD AT ALL

## 2023-03-29 SDOH — ECONOMIC STABILITY: FOOD INSECURITY: WITHIN THE PAST 12 MONTHS, THE FOOD YOU BOUGHT JUST DIDN'T LAST AND YOU DIDN'T HAVE MONEY TO GET MORE.: NEVER TRUE

## 2023-03-29 SDOH — ECONOMIC STABILITY: FOOD INSECURITY: WITHIN THE PAST 12 MONTHS, YOU WORRIED THAT YOUR FOOD WOULD RUN OUT BEFORE YOU GOT MONEY TO BUY MORE.: NEVER TRUE

## 2023-03-29 ASSESSMENT — PATIENT HEALTH QUESTIONNAIRE - PHQ9
1. LITTLE INTEREST OR PLEASURE IN DOING THINGS: 0
9. THOUGHTS THAT YOU WOULD BE BETTER OFF DEAD, OR OF HURTING YOURSELF: 0
2. FEELING DOWN, DEPRESSED OR HOPELESS: 0
4. FEELING TIRED OR HAVING LITTLE ENERGY: 0
3. TROUBLE FALLING OR STAYING ASLEEP: 0
SUM OF ALL RESPONSES TO PHQ QUESTIONS 1-9: 0
SUM OF ALL RESPONSES TO PHQ9 QUESTIONS 1 & 2: 0
8. MOVING OR SPEAKING SO SLOWLY THAT OTHER PEOPLE COULD HAVE NOTICED. OR THE OPPOSITE, BEING SO FIGETY OR RESTLESS THAT YOU HAVE BEEN MOVING AROUND A LOT MORE THAN USUAL: 0
SUM OF ALL RESPONSES TO PHQ QUESTIONS 1-9: 0
5. POOR APPETITE OR OVEREATING: 0
SUM OF ALL RESPONSES TO PHQ QUESTIONS 1-9: 0
10. IF YOU CHECKED OFF ANY PROBLEMS, HOW DIFFICULT HAVE THESE PROBLEMS MADE IT FOR YOU TO DO YOUR WORK, TAKE CARE OF THINGS AT HOME, OR GET ALONG WITH OTHER PEOPLE: 0
6. FEELING BAD ABOUT YOURSELF - OR THAT YOU ARE A FAILURE OR HAVE LET YOURSELF OR YOUR FAMILY DOWN: 0
SUM OF ALL RESPONSES TO PHQ QUESTIONS 1-9: 0
7. TROUBLE CONCENTRATING ON THINGS, SUCH AS READING THE NEWSPAPER OR WATCHING TELEVISION: 0

## 2023-03-29 ASSESSMENT — ANXIETY QUESTIONNAIRES
2. NOT BEING ABLE TO STOP OR CONTROL WORRYING: 0
1. FEELING NERVOUS, ANXIOUS, OR ON EDGE: 0
6. BECOMING EASILY ANNOYED OR IRRITABLE: 0
4. TROUBLE RELAXING: 0
7. FEELING AFRAID AS IF SOMETHING AWFUL MIGHT HAPPEN: 0
3. WORRYING TOO MUCH ABOUT DIFFERENT THINGS: 0
5. BEING SO RESTLESS THAT IT IS HARD TO SIT STILL: 0
GAD7 TOTAL SCORE: 0
IF YOU CHECKED OFF ANY PROBLEMS ON THIS QUESTIONNAIRE, HOW DIFFICULT HAVE THESE PROBLEMS MADE IT FOR YOU TO DO YOUR WORK, TAKE CARE OF THINGS AT HOME, OR GET ALONG WITH OTHER PEOPLE: NOT DIFFICULT AT ALL

## 2023-03-29 NOTE — PROGRESS NOTES
Deirdre Choe is a 20 y.o. female (: 2000) presenting to address:    Chief Complaint   Patient presents with    Medication Refill       There were no vitals filed for this visit. Coordination of Care Questionaire:   1. \"Have you been to the ER, urgent care clinic since your last visit? Hospitalized since your last visit? \" No    2. \"Have you seen or consulted any other health care providers outside of the [address] [place] [address] since your last visit? \" No     3. For patients aged 39-70: Has the patient had a colonoscopy / FIT/ Cologuard? NA - based on age      If the patient is female:    4. For patients aged 41-77: Has the patient had a mammogram within the past 2 years? NA - based on age or sex      11. For patients aged 21-65: Has the patient had a pap smear? No    Advanced Directive:   1. Do you have an Advanced Directive? Yes    2. Would you like information on Advanced Directives?  No    [phone]

## 2023-04-21 NOTE — TELEPHONE ENCOUNTER
I updated the order Pt has an appt on 12/22 and would like to have labs done before appt.  Please review and f/u

## 2023-07-05 ENCOUNTER — OFFICE VISIT (OUTPATIENT)
Dept: FAMILY MEDICINE CLINIC | Facility: CLINIC | Age: 23
End: 2023-07-05
Payer: COMMERCIAL

## 2023-07-05 VITALS
SYSTOLIC BLOOD PRESSURE: 103 MMHG | HEART RATE: 75 BPM | OXYGEN SATURATION: 98 % | WEIGHT: 153 LBS | BODY MASS INDEX: 27.11 KG/M2 | RESPIRATION RATE: 20 BRPM | HEIGHT: 63 IN | DIASTOLIC BLOOD PRESSURE: 67 MMHG | TEMPERATURE: 97.3 F

## 2023-07-05 DIAGNOSIS — Z51.81 MEDICATION MONITORING ENCOUNTER: Primary | ICD-10-CM

## 2023-07-05 DIAGNOSIS — F90.2 ATTENTION-DEFICIT HYPERACTIVITY DISORDER, COMBINED TYPE: ICD-10-CM

## 2023-07-05 DIAGNOSIS — F41.9 ANXIETY: ICD-10-CM

## 2023-07-05 PROCEDURE — 99214 OFFICE O/P EST MOD 30 MIN: CPT | Performed by: STUDENT IN AN ORGANIZED HEALTH CARE EDUCATION/TRAINING PROGRAM

## 2023-07-05 RX ORDER — METHYLPHENIDATE HYDROCHLORIDE 10 MG/1
10 TABLET ORAL 2 TIMES DAILY
Qty: 60 TABLET | Refills: 0 | Status: SHIPPED | OUTPATIENT
Start: 2023-07-20 | End: 2023-08-19

## 2023-07-05 RX ORDER — METHYLPHENIDATE HYDROCHLORIDE 10 MG/1
10 TABLET ORAL 2 TIMES DAILY
Qty: 60 TABLET | Refills: 0 | Status: SHIPPED | OUTPATIENT
Start: 2023-08-19 | End: 2023-09-18

## 2023-07-05 RX ORDER — ESCITALOPRAM OXALATE 20 MG/1
20 TABLET ORAL DAILY
Qty: 90 TABLET | Refills: 3 | Status: SHIPPED | OUTPATIENT
Start: 2023-07-05

## 2023-07-05 RX ORDER — METHYLPHENIDATE HYDROCHLORIDE 10 MG/1
TABLET ORAL
COMMUNITY
Start: 2023-03-15

## 2023-07-05 RX ORDER — METHYLPHENIDATE HYDROCHLORIDE 10 MG/1
10 TABLET ORAL 2 TIMES DAILY
Qty: 60 TABLET | Refills: 0 | Status: SHIPPED | OUTPATIENT
Start: 2023-09-18 | End: 2023-10-18

## 2023-07-05 ASSESSMENT — PATIENT HEALTH QUESTIONNAIRE - PHQ9
SUM OF ALL RESPONSES TO PHQ QUESTIONS 1-9: 0
1. LITTLE INTEREST OR PLEASURE IN DOING THINGS: 0
SUM OF ALL RESPONSES TO PHQ QUESTIONS 1-9: 0
SUM OF ALL RESPONSES TO PHQ QUESTIONS 1-9: 0
SUM OF ALL RESPONSES TO PHQ9 QUESTIONS 1 & 2: 0
2. FEELING DOWN, DEPRESSED OR HOPELESS: 0
SUM OF ALL RESPONSES TO PHQ QUESTIONS 1-9: 0

## 2023-07-12 LAB
T4 FREE: 0.9 NG/DL (ref 0.9–1.8)
TSH SERPL DL<=0.05 MIU/L-ACNC: 1.93 MCU/ML (ref 0.27–4.2)